# Patient Record
Sex: MALE | Race: WHITE | Employment: FULL TIME | URBAN - METROPOLITAN AREA
[De-identification: names, ages, dates, MRNs, and addresses within clinical notes are randomized per-mention and may not be internally consistent; named-entity substitution may affect disease eponyms.]

---

## 2017-02-28 ENCOUNTER — HOSPITAL ENCOUNTER (OUTPATIENT)
Dept: LAB | Age: 57
Discharge: HOME | End: 2017-02-28

## 2017-02-28 PROCEDURE — 88305 TISSUE EXAM BY PATHOLOGIST: CPT | Performed by: INTERNAL MEDICINE

## 2021-05-14 PROBLEM — I10 HYPERTENSION: Status: ACTIVE | Noted: 2021-05-14

## 2021-05-14 PROBLEM — Z12.11 COLON CANCER SCREENING: Status: ACTIVE | Noted: 2021-05-14

## 2021-05-14 PROBLEM — Z12.5 PROSTATE CANCER SCREENING: Status: ACTIVE | Noted: 2021-05-14

## 2021-11-15 ENCOUNTER — HOSPITAL ENCOUNTER (OUTPATIENT)
Dept: GENERAL RADIOLOGY | Age: 61
Discharge: HOME | End: 2021-11-15

## 2021-11-15 DIAGNOSIS — G89.29 CHRONIC LEFT SHOULDER PAIN: ICD-10-CM

## 2021-11-15 DIAGNOSIS — M25.512 CHRONIC LEFT SHOULDER PAIN: ICD-10-CM

## 2021-11-15 NOTE — PROGRESS NOTES
Call patient. Left shoulder x-rays are normal.  Refer to [place] [address] for left shoulder pain.   Notify patient regarding referral.

## 2021-11-15 NOTE — PROGRESS NOTES
Left detailed message on personal VM regarding results. Advised referral being placed to [facility] and let patient know they will call to schedule.  Referral placed to [facility]

## 2022-03-18 PROBLEM — Z12.11 COLON CANCER SCREENING: Status: ACTIVE | Noted: 2021-05-14

## 2022-03-19 PROBLEM — I10 HYPERTENSION: Status: ACTIVE | Noted: 2021-05-14

## 2022-03-19 PROBLEM — Z12.5 PROSTATE CANCER SCREENING: Status: ACTIVE | Noted: 2021-05-14

## 2022-04-25 DIAGNOSIS — R73.01 IMPAIRED FASTING GLUCOSE: ICD-10-CM

## 2022-04-25 DIAGNOSIS — E78.5 DYSLIPIDEMIA: ICD-10-CM

## 2022-04-25 DIAGNOSIS — I10 HYPERTENSION, UNSPECIFIED TYPE: Primary | ICD-10-CM

## 2022-05-16 DIAGNOSIS — C61 PROSTATE CANCER (HCC): Primary | ICD-10-CM

## 2022-05-16 DIAGNOSIS — Z12.5 PROSTATE CANCER SCREENING: ICD-10-CM

## 2022-06-15 DIAGNOSIS — M77.8 LEFT SHOULDER TENDINITIS: Primary | ICD-10-CM

## 2022-06-15 RX ORDER — CELECOXIB 200 MG/1
200 CAPSULE ORAL 2 TIMES DAILY
Qty: 28 CAPSULE | Refills: 0 | Status: SHIPPED | OUTPATIENT
Start: 2022-06-15 | End: 2022-06-23

## 2022-06-16 ENCOUNTER — TELEPHONE (OUTPATIENT)
Dept: INTERNAL MEDICINE CLINIC | Facility: CLINIC | Age: 62
End: 2022-06-16

## 2022-06-16 RX ORDER — METHYLPREDNISOLONE 4 MG/1
TABLET ORAL
Qty: 1 KIT | Refills: 0 | Status: SHIPPED | OUTPATIENT
Start: 2022-06-16 | End: 2022-06-22

## 2022-06-20 NOTE — TELEPHONE ENCOUNTER
Spoke with patient regarding recurrent left shoulder tendinitis. Pain interfering with sleep at night and worse with attempted overhead motion. No weakness. Previous left shoulder X-rays negative. Has not improved with Celebrex 200 mg po BID. Referral placed to [facility] orthopedics appointment pending. Trial Medrol dose pack x one.

## 2022-06-23 ENCOUNTER — OFFICE VISIT (OUTPATIENT)
Dept: INTERNAL MEDICINE CLINIC | Facility: CLINIC | Age: 62
End: 2022-06-23
Payer: COMMERCIAL

## 2022-06-23 VITALS
OXYGEN SATURATION: 98 % | SYSTOLIC BLOOD PRESSURE: 168 MMHG | HEIGHT: 69 IN | BODY MASS INDEX: 31.76 KG/M2 | DIASTOLIC BLOOD PRESSURE: 94 MMHG | HEART RATE: 74 BPM | WEIGHT: 214.4 LBS | RESPIRATION RATE: 16 BRPM

## 2022-06-23 DIAGNOSIS — M25.512 ACUTE PAIN OF LEFT SHOULDER: Primary | ICD-10-CM

## 2022-06-23 DIAGNOSIS — F41.0 PANIC DISORDER: ICD-10-CM

## 2022-06-23 DIAGNOSIS — M77.8 LEFT SHOULDER TENDINITIS: ICD-10-CM

## 2022-06-23 PROCEDURE — 99214 OFFICE O/P EST MOD 30 MIN: CPT | Performed by: INTERNAL MEDICINE

## 2022-06-23 RX ORDER — PAROXETINE 10 MG/1
10 TABLET, FILM COATED ORAL DAILY
Qty: 90 TABLET | Refills: 0 | Status: SHIPPED | OUTPATIENT
Start: 2022-06-23

## 2022-06-23 RX ORDER — TIZANIDINE 2 MG/1
2 TABLET ORAL EVERY 6 HOURS PRN
Qty: 20 TABLET | Refills: 0 | Status: SHIPPED | OUTPATIENT
Start: 2022-06-23 | End: 2022-07-05

## 2022-06-23 RX ORDER — CELECOXIB 200 MG/1
200 CAPSULE ORAL 2 TIMES DAILY
Qty: 28 CAPSULE | Refills: 0 | Status: SHIPPED | OUTPATIENT
Start: 2022-06-23

## 2022-06-23 ASSESSMENT — ANXIETY QUESTIONNAIRES
7. FEELING AFRAID AS IF SOMETHING AWFUL MIGHT HAPPEN: 0
3. WORRYING TOO MUCH ABOUT DIFFERENT THINGS: 0
6. BECOMING EASILY ANNOYED OR IRRITABLE: 0
2. NOT BEING ABLE TO STOP OR CONTROL WORRYING: 0
GAD7 TOTAL SCORE: 0
4. TROUBLE RELAXING: 0
5. BEING SO RESTLESS THAT IT IS HARD TO SIT STILL: 0
1. FEELING NERVOUS, ANXIOUS, OR ON EDGE: 0

## 2022-06-23 ASSESSMENT — PATIENT HEALTH QUESTIONNAIRE - PHQ9
SUM OF ALL RESPONSES TO PHQ QUESTIONS 1-9: 0
SUM OF ALL RESPONSES TO PHQ QUESTIONS 1-9: 0
2. FEELING DOWN, DEPRESSED OR HOPELESS: 0
SUM OF ALL RESPONSES TO PHQ QUESTIONS 1-9: 0
SUM OF ALL RESPONSES TO PHQ QUESTIONS 1-9: 0
SUM OF ALL RESPONSES TO PHQ9 QUESTIONS 1 & 2: 0
1. LITTLE INTEREST OR PLEASURE IN DOING THINGS: 0

## 2022-06-23 ASSESSMENT — ENCOUNTER SYMPTOMS
SHORTNESS OF BREATH: 0
PHOTOPHOBIA: 0
WHEEZING: 0
CHEST TIGHTNESS: 0

## 2022-06-23 NOTE — PROGRESS NOTES
Chief Complaint   Patient presents with    Shoulder Pain     radiates down arm. Left. Celebrex, Medrol didn't help. Ana Rosa Jung is a 63 y.o. male who presents today for Shoulder Pain (radiates down arm. Left. Celebrex, Medrol didn't help. 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 He has been communicating with his primary care Dr. Marcia Melgar, he tried Celebrex and corticosteroid package, without significant improvement, his pain change from 2-8 over 10 depending of the activity he is doing, the worse exacerbating factor is laying down, is better when he is sitting. Denies any joint swelling. Similar episode months ago, that resolved with Celebrex, he has arrange a follow-up with orthopedic for next month. Had a normal x-ray done 10 last year    Requesting refill of his medications Paxil. Plan to follow-up with PCP next week      Wt Readings from Last 3 Encounters:   06/23/22 214 lb 6.4 oz (97.3 kg)   12/29/21 211 lb 6.4 oz (95.9 kg)   10/13/21 216 lb (98 kg)     Vitals:    06/23/22 1454   BP: (!) 168/94   Site: Left Upper Arm   Position: Sitting   Pulse: 74   Resp: 16   SpO2: 98%   Weight: 214 lb 6.4 oz (97.3 kg)   Height: 5' 9\" (1.753 m)        Assessment and plan:  1. Acute pain of left shoulder  -     tiZANidine (ZANAFLEX) 2 MG tablet; Take 1 tablet by mouth every 6 hours as needed (for pain), Disp-20 tablet, R-0Normal  2. Panic disorder  -     PARoxetine (PAXIL) 10 MG tablet; Take 1 tablet by mouth daily, Disp-90 tablet, R-0Normal  3. Left shoulder tendinitis  -     celecoxib (CELEBREX) 200 MG capsule;  Take 1 capsule by mouth 2 times daily, Disp-28 capsule, R-0Normal  Left shoulder pain, that is not responding to conservative therapy, will try again Celebrex, with muscle relaxant tizanidine as needed. We will follow-up with PCP next week, may require further imaging if not improvement, including scapular x-ray, or MRI of the shoulder. He will follow-up with orthopedics as planned. Discussed about the importance of exercises. Return if symptoms worsen or fail to improve. Review of system:    Review of Systems   Constitutional: Negative for activity change, fatigue and unexpected weight change. Eyes: Negative for photophobia and visual disturbance. Respiratory: Negative for chest tightness, shortness of breath and wheezing. Cardiovascular: Negative for chest pain, palpitations and leg swelling. Musculoskeletal: Positive for arthralgias and myalgias. Negative for joint swelling. Neurological: Negative for dizziness and headaches. Immunization history:    Immunization History   Administered Date(s) Administered    COVID-19, Moderna, Primary or Immunocompromised, PF, 100mcg/0.5mL 08/14/2021, 09/11/2021    Tdap (Boostrix, Adacel) 08/20/2019       Current medications:      Current Outpatient Medications:     PARoxetine (PAXIL) 10 MG tablet, Take 1 tablet by mouth daily, Disp: 90 tablet, Rfl: 0    celecoxib (CELEBREX) 200 MG capsule, Take 1 capsule by mouth 2 times daily, Disp: 28 capsule, Rfl: 0    tiZANidine (ZANAFLEX) 2 MG tablet, Take 1 tablet by mouth every 6 hours as needed (for pain), Disp: 20 tablet, Rfl: 0    clonazePAM (KLONOPIN) 0.5 MG tablet, Take 0.25 mg by mouth daily as needed. , Disp: , Rfl:     lisinopril (PRINIVIL;ZESTRIL) 10 MG tablet, TAKE 1 TABLET BY MOUTH ONCE DAILY, Disp: , Rfl:     tadalafil (CIALIS) 20 MG tablet, Take 20 mg by mouth as needed, Disp: , Rfl:     traZODone (DESYREL) 50 MG tablet, Take 50 mg by mouth, Disp: , Rfl:       Family history:    Family History   Problem Relation Age of Onset    Breast Cancer Mother     Diabetes Father     Hypertension Father    [facility] Cancer Maternal Grandfather         stomach        Past medical history:    Past Medical History:   Diagnosis Date    Dyslipidemia     ED (erectile dysfunction)     HSV-2 infection     Hypertension     Impaired fasting glucose     Panic disorder           Physical exam:    BP (!) 168/94 (Site: Left Upper Arm, Position: Sitting)   Pulse 74   Resp 16   Ht 5' 9\" (1.753 m)   Wt 214 lb 6.4 oz (97.3 kg)   SpO2 98%   BMI 31.66 kg/m²     Physical Exam  Vitals reviewed. Constitutional:       Appearance: Normal appearance. Cardiovascular:      Rate and Rhythm: Normal rate and regular rhythm. Pulses: Normal pulses. Pulmonary:      Effort: Pulmonary effort is normal.      Breath sounds: Normal breath sounds. Musculoskeletal:      Right shoulder: Normal.      Left shoulder: Tenderness present. No swelling, deformity, effusion or laceration. Normal range of motion. Arms:    Neurological:      Mental Status: He is alert.           Recent labs:    Lab Results   Component Value Date    CHOL 178 12/15/2021    CHOL 184 06/15/2021     Lab Results   Component Value Date    TRIG 127 12/15/2021    TRIG 108 06/15/2021     Lab Results   Component Value Date    HDL 34 (L) 12/15/2021    HDL 37 (L) 06/15/2021     Lab Results   Component Value Date    LDLCALC 121 (H) 12/15/2021    LDLCALC 127 (H) 06/15/2021     Lab Results   Component Value Date    VLDL 23 12/15/2021    VLDL 20 06/15/2021     No results found for: [facility]  Lab Results   Component Value Date     12/15/2021    K 4.5 12/15/2021    CL 99 12/15/2021    CO2 24 12/15/2021    BUN 17 12/15/2021    CREATININE 1.09 12/15/2021    GLUCOSE 89 12/15/2021    CALCIUM 9.3 12/15/2021    PROT 6.8 12/15/2021    LABALBU 4.7 12/15/2021    BILITOT 0.5 12/15/2021    ALKPHOS 66 12/15/2021    AST 20 12/15/2021    ALT 32 12/15/2021    GFRAA 85 12/15/2021    AGRATIO 2.2 12/15/2021     Lab Results   Component Value Date    WBC 5.9 06/15/2021    HGB 16.3 06/15/2021 HCT 48.5 06/15/2021    MCV 92 06/15/2021     06/15/2021             This document was generated with the aid of voice recognition software. . Please be aware that there may be inadvertent transcription errors not identified and corrected by the [employer]

## 2022-06-23 NOTE — PATIENT INSTRUCTIONS
Patient Education        Shoulder Blade: Exercises  Introduction  Here are some examples of exercises for you to try. The exercises may be suggested for a condition or for rehabilitation. Start each exercise slowly. Ease off the exercises if you start to have pain. You will be told when to start these exercises and which ones will work bestfor you. How to do the exercises  Shoulder roll    1. Stand tall with your chin slightly tucked. Imagine that a string at the top of your head is pulling you straight up. 2. Keep your arms relaxed. All motion will be in your shoulders. 3. Shrug your shoulders up toward your ears, then up and back. [place] your shoulders down and back, like you're sliding your hands down into your back pants pockets. 4. Repeat the circles at least 2 to 4 times. 5. This exercise is also helpful anytime you want to relax. Lower neck and upper back stretch    1. With your arms about shoulder height, clasp your hands in front of you. 2. Drop your chin toward your chest.  3. Reach straight forward so you are rounding your upper back. Think about pulling your shoulder blades apart. Paulita Dakin feel a stretch across your upper back and shoulders. Hold for at least 6 seconds. 4. Repeat 2 to 4 times. Triceps stretch    1. Reach your arm straight up. 2. Keeping your elbow in place, bend your arm and reach your hand down behind your back. 3. With your other hand, apply gentle pressure to the bent elbow. Paulita Dakin feel a stretch at the back of your upper arm and shoulder. Hold about 6 seconds. 4. Repeat 2 to 4 times with each arm. Shoulder stretch    1. Relax your shoulders. 2. Raise one arm to shoulder height, and reach it across your chest.  3. Pull the arm slightly toward you with your other arm. This will help you get a gentle stretch. Hold for about 6 seconds. 4. Repeat 2 to 4 times. Shoulder blade squeeze    1. Sit or stand up tall with your arms at your sides.   2. Keep your shoulders relaxed and down, not shrugged. 3. Squeeze your shoulder blades together. Hold for 6 seconds, then relax. 4. Repeat 8 to 12 times. Straight-arm shoulder blade squeeze    1. Sit or stand tall. Relax your shoulders. 2. With palms down, hold your elastic tubing or band straight out in front of you. 3. Start with slight tension in the tubing or band, with your hands about shoulder-width apart. 4. Slowly pull straight out to the sides, squeezing your shoulder blades together. Keep your arms straight and at shoulder height. Slowly release. 5. Repeat 8 to 12 times. Rowing    1. [place] your elastic tubing or band at about waist height. Take one end in each hand. 2. Sit or stand with your feet hip-width apart. 3. Hold your arms straight in front of you. Adjust your distance to create slight tension in the tubing or band. 4. Slightly tuck your chin. Relax your shoulders. 5. Without shrugging your shoulders, pull straight back. Your elbows will pass alongside your waist.  Pull-downs    1. [place] your elastic tubing or band in the top of a closed door. Take one end in each hand. 2. Either sit or stand, depending on what is more comfortable. If you feel unsteady, sit on a chair. 3. Start with your arms up and comfortably apart, elbows straight. There should be a slight tension in the tubing or band. 4. Slightly tuck your chin, and look straight ahead. 5. Keeping your back straight, slowly pull down and back, bending your elbows. 6. Stop where your hands are level with your chin, in a \"goalpost\" position. 7. Repeat 8 to 12 times. Chest T stretch    1. Lie on your back. Raise your knees so they are bent. Plant your feet on the floor, hip-width apart. 2. Tuck your chin, and relax your shoulders. 3. Reach your arms straight out to the sides. If you don't feel a mild stretch in your shoulders and across your chest, use a foam roll or a tightly rolled blanket under your spine, from your tailbone to your head.   4. Relax in this position for at least 15 to 30 seconds while you breathe normally. Repeat 2 to 4 times. 5. As you get used to this stretch, keep adding a little more time until you are able relax in this position for 2 or 3 minutes. When you can relax for at least 2 minutes, you only need to do the exercise 1 time per session. Chest goalpost stretch    1. Lie on your back. Raise your knees so they are bent. Plant your feet on the floor, hip-width apart. 2. Tuck your chin, and relax your shoulders. 3. Reach your arms straight out to the sides. 4. Bend your arms at the elbows, with your hands pointed toward the top of your head. Your arms should make an L on either side of your head. Your palms should be facing up. 5. If you don't feel a mild stretch in your shoulders and across your chest, use a foam roll or tightly rolled blanket under your spine, from your tailbone to your head. 6. Relax in this position for at least 15 to 30 seconds while you breathe normally. Repeat 2 to 4 times. 7. Each day you do this exercise, add a little more time until you can relax in this position for 2 or 3 minutes. When you can relax for at least 2 minutes, you only need to do the exercise 1 time per session. Follow-up care is a key part of your treatment and safety. Be sure to make and go to all appointments, and call your doctor if you are having problems. It's also a good idea to know your test results and keep alist of the medicines you take. Where can you learn more? Go to https://[employer]. org and sign in to your [employer] account. Enter ([phone] in the [facility] box to learn more about \"Shoulder Blade: Exercises. \"     If you do not have an account, please click on the \"Sign Up Now\" link. Current as of: March 9, 2022               Content Version: 13.3  © 7047-6279 [employer]. Care instructions adapted under license by [facility] ([facility]).  If you have questions about a medical condition or this instruction, always ask your healthcare professional. [place] [address] any warranty or liability for your use of this information.

## 2022-06-27 ENCOUNTER — CLINICAL DOCUMENTATION (OUTPATIENT)
Dept: INTERNAL MEDICINE CLINIC | Facility: CLINIC | Age: 62
End: 2022-06-27

## 2022-06-27 DIAGNOSIS — M25.512 ACUTE PAIN OF LEFT SHOULDER: ICD-10-CM

## 2022-06-27 DIAGNOSIS — M25.512 LEFT SHOULDER PAIN, UNSPECIFIED CHRONICITY: Primary | ICD-10-CM

## 2022-06-27 DIAGNOSIS — M54.2 NECK PAIN ON LEFT SIDE: ICD-10-CM

## 2022-06-27 RX ORDER — GABAPENTIN 300 MG/1
CAPSULE ORAL
Qty: 90 CAPSULE | Refills: 0 | Status: SHIPPED | OUTPATIENT
Start: 2022-06-27 | End: 2022-07-27

## 2022-06-27 ASSESSMENT — ENCOUNTER SYMPTOMS
STRIDOR: 0
EYE PAIN: 0
RECTAL PAIN: 0
VOICE CHANGE: 0
CHOKING: 0

## 2022-06-27 NOTE — PROGRESS NOTES
FOLLOWUP VISIT    Subjective:    Mr. Martin Pérez is a 63 y.o., male,   Chief Complaint   Patient presents with    Shoulder Pain       HPI:    Patient was seen today by me at his home. The patient happens to live next door to me and when I arrived home today from work he asked me to evaluate his persistent left shoulder pain. Patient has been having severe pain involving his left shoulder at times radiating down his left arm into the forearm. We spoke via telephone approximately a week ago at which time he felt his current left shoulder pain was similar to the left rotator cuff tendinitis pain he had experienced months ago. He was treated empirically with Celebrex 200 mg po BID which did not help. He was next given a Medrol dose pack which did not help. A referral was placed to orthopedics but their next available appointment is not until mid-late July. He was seen several days ago by Dr. Katlyn Lai and placed on tizanidine in addition to Celebrex but he remains in pain. He states that pain is located vaguely all around his left anterior / superior / posterior shoulder area. Occasionally he has interscapular pain on the left. Sometimes it hurts to raise his left arm above his head (he has had a history of left rotator cuff tendinitis) but his pain primarily occurs at night when he attempts to lie supine in bed which his head on his pillow. He has had to sleep upright in a chair with his head / neck and shoulder propped up a certain way. Today he also provides some additional history. .. his current pain began about two weeks ago when he was driving a tractor and hit a hidden stump which caused the tractor to come to an abrupt stop and he sustained a whiplash type injury. He denies any upper extremity weakness or numbness.        The following portions of the patient's history were reviewed and updated as appropriate:      Past Medical History:   Diagnosis Date    Dyslipidemia     ED (erectile dysfunction)     HSV-2 infection     Hypertension     Impaired fasting glucose     Panic disorder        Past Surgical History:   Procedure Laterality Date    TONSILLECTOMY         Family History   Problem Relation Age of Onset    Breast Cancer Mother     Diabetes Father     Hypertension Father     Cancer Maternal Grandfather         stomach       Social History     Socioeconomic History    Marital status:      Spouse name: Not on file    Number of children: Not on file    Years of education: Not on file    Highest education level: Not on file   Occupational History    Not on file   Tobacco Use    Smoking status: Never Smoker    Smokeless tobacco: Never Used   Substance and Sexual Activity    Alcohol use: No    Drug use: No    Sexual activity: Not on file   Other Topics Concern    Not on file   Social History Narrative    Not on file     Social Determinants of Health     Financial Resource Strain:     Difficulty of Paying Living Expenses: Not on file   Food Insecurity:     Worried About Running Out of Food in the Last Year: Not on file    Chandan of Food in the Last Year: Not on file   Transportation Needs:     Lack of Transportation (Medical): Not on file    Lack of Transportation (Non-Medical):  Not on file   Physical Activity:     Days of Exercise per Week: Not on file    Minutes of Exercise per Session: Not on file   Stress:     Feeling of Stress : Not on file   Social Connections:     Frequency of Communication with Friends and Family: Not on file    Frequency of Social Gatherings with Friends and Family: Not on file    Attends Jehov[employer]'s witness Services: Not on file    Active Member of Clubs or Organizations: Not on file    Attends Club or Organization Meetings: Not on file    Marital Status: Not on file   Intimate Partner Violence:     Fear of Current or Ex-Partner: Not on file    Emotionally Abused: Not on file    Physically Abused: Not on file    Sexually Abused: Not on file   Housing Stability:     Unable to Pay for Housing in the Last Year: Not on file    Number of Places Lived in the Last Year: Not on file    Unstable Housing in the Last Year: Not on file       Current Outpatient Medications   Medication Sig Dispense Refill    PARoxetine (PAXIL) 10 MG tablet Take 1 tablet by mouth daily 90 tablet 0    celecoxib (CELEBREX) 200 MG capsule Take 1 capsule by mouth 2 times daily 28 capsule 0    tiZANidine (ZANAFLEX) 2 MG tablet Take 1 tablet by mouth every 6 hours as needed (for pain) 20 tablet 0    clonazePAM (KLONOPIN) 0.5 MG tablet Take 0.25 mg by mouth daily as needed.  lisinopril (PRINIVIL;ZESTRIL) 10 MG tablet TAKE 1 TABLET BY MOUTH ONCE DAILY      tadalafil (CIALIS) 20 MG tablet Take 20 mg by mouth as needed      traZODone (DESYREL) 50 MG tablet Take 50 mg by mouth       No current facility-administered medications for this visit. Allergies as of 06/27/2022    (No Known Allergies)       Review of Systems   Constitutional: Negative for activity change and appetite change. HENT: Negative for drooling and voice change. Eyes: Negative for pain. Respiratory: Negative for choking and stridor. Gastrointestinal: Negative for rectal pain. Endocrine: Negative for polydipsia and polyphagia. Genitourinary: Negative for enuresis and penile pain. Musculoskeletal: Negative for gait problem and neck stiffness. Skin: Negative for pallor. Allergic/Immunologic: Negative for immunocompromised state. Neurological: Negative for facial asymmetry and speech difficulty. Hematological: Does not bruise/bleed easily. Psychiatric/Behavioral: Negative for self-injury. The patient is not hyperactive. Patient Care Team:  Laura Iqbal MD as PCP - General  Laura Iqbal MD as PCP - REHABILITATION [facility] Empaneled Provider    Objective: There were no vitals taken for this visit. Physical Exam  Constitutional:       General: He is not in acute distress. Appearance: He is not toxic-appearing. HENT:      Head: Normocephalic and atraumatic. Nose: Nose normal.   Eyes:      Extraocular Movements: Extraocular movements intact. Conjunctiva/sclera: Conjunctivae normal.   Pulmonary:      Effort: Pulmonary effort is normal. No respiratory distress. Breath sounds: No stridor. Musculoskeletal:      Comments: Patient has no bony vertebral tenderness. He has mild pain with palpation over the left cervical paraspinal muscles. He can rotate his chin toward his right shoulder but when he attempts to rotate his chin toward the left shoulder he has reproduction of his left shoulder pain radiating into his left forearm. His pain is even worse if he attempts to rotate his chin to the left shoulder while simultaneously extending his neck. He has mild pain with palpation of the left subacromial space and a small amount of discomfort with left shoulder ROM but he is able to completed adduct and abduct his left shoulder 180 degrees without weakness. Motor is 5/5 in all motor groups of both upper extremities.  strength intact and symmetric both hands. Sensory intact throughout both upper extremities. DTR's were somewhat difficult to elicit on either side as I think he was involuntarily guarding during attempted DTR examination. Skin:     Coloration: Skin is not jaundiced or pale. Neurological:      Mental Status: He is alert and oriented to person, place, and time. Psychiatric:         Thought Content:  Thought content normal.              [facility] Historical Encounter on 12/15/2021   Component Date Value Ref Range Status    Glucose 12/15/2021 89  65 - 99 mg/dL Final    BUN 12/15/2021 17  6 - 24 mg/dL Final    CREATININE 12/15/2021 1.09  0.76 - 1.27 mg/dL Final    EGFR IF NonAfrican American 12/15/2021 74  >59 mL/min/1.73 Final    GFR  12/15/2021 85  >59 mL/min/1.73 Final    Comment: **In accordance with recommendations from the NKF-ASN Task force,**    [facility] is in the process of updating its eGFR calculation to the    2021 CKD-EPI creatinine equation that estimates kidney function    without a race variable.  Bun/Cre Ratio 12/15/2021 16  9 - 20 NA Final    Sodium 12/15/2021 138  134 - 144 mmol/L Final    Potassium 12/15/2021 4.5  3.5 - 5.2 mmol/L Final    Chloride 12/15/2021 99  96 - 106 mmol/L Final    CO2 12/15/2021 24  20 - 29 mmol/L Final    Calcium 12/15/2021 9.3  8.7 - 10.2 mg/dL Final    Total Protein 12/15/2021 6.8  6.0 - 8.5 g/dL Final    Albumin 12/15/2021 4.7  3.8 - 4.9 g/dL Final    Globulin, Total 12/15/2021 2.1  1.5 - 4.5 g/dL Final    Albumin/Globulin Ratio 12/15/2021 2.2  1.2 - 2.2 NA Final    Total Bilirubin 12/15/2021 0.5  0.0 - 1.2 mg/dL Final    Alkaline Phosphatase 12/15/2021 66  44 - 121 IU/L Final                  **Please note reference interval change**    AST 12/15/2021 20  0 - 40 IU/L Final    ALT 12/15/2021 32  0 - 44 IU/L Final    Hemoglobin A1C 12/15/2021 5.9* 4.8 - 5.6 % Final    Comment:          Prediabetes: 5.7 - 6.4           Diabetes: >6.4           Glycemic control for adults with diabetes: <7.0      eAG 12/15/2021 123  mg/dL Final    Cholesterol, Total 12/15/2021 178  100 - 199 mg/dL Final    Triglycerides 12/15/2021 127  0 - 149 mg/dL Final    HDL 12/15/2021 34* >39 mg/dL Final    VLDL 12/15/2021 23  5 - 40 mg/dL Final    LDL Calculated 12/15/2021 121* 0 - 99 mg/dL Final         Assessent & Plan:        1. Left shoulder pain, unspecified chronicity  -     gabapentin (NEURONTIN) 300 MG capsule; Take 1-3 capsules at bedtime as directed, Disp-90 capsule, R-0Normal  -     [facility] - Physical Therapy, [facility]  2.  Acute pain of left shoulder  Overview:  I suspect the patient has both a mild case of smoldering chronic left rotator cuff tendinitis and a superimposed acute left cervical radiculopathy triggered by a whiplash injury sustained while driving a tractor in a field and hitting a hidden stump. Based on today's examination on 6/27/22 I suspect the main primary issue related to his current shoulder pain is an acute cervical radiculopathy. He has no motor or sensory deficit. He has had no improvement despite a medrol dose pack and current celebrex 200 mg po BID + tizanidine 2 mg QD PRN. Since his symptoms are most bothersome at night will start gabapentin 300 mg po QHS. He may increase this dose to 600 mg and then to 900 mg every several days if needed. He was warned regarding the potential sedating effect of gabapentin and advised to not combine with tizanidine, trazodone, or alcohol / other sedatives. Will obtain plain films of the cervical spine given his whiplash injury to rule out spondylolisthesis but I expect all they may reveal is age related degenerative changes. Will refer to PT. If symptoms persist despite all of the above at that point I think he will require an MRI. Orders:  [facility] - Physical Therapy, [facility]  3. Neck pain on left side  -     XR CERVICAL SPINE (2-3 VIEWS); Future  -     [facility] - Physical Therapy, [facility]        The patient and/or patient representative voiced understanding and agreement with the current diagnoses, recommendations, and possible side effects. No follow-ups on file.   F/U as scheduled with me on 7/5/22

## 2022-07-01 DIAGNOSIS — Z12.5 PROSTATE CANCER SCREENING: ICD-10-CM

## 2022-07-01 DIAGNOSIS — E78.5 DYSLIPIDEMIA: ICD-10-CM

## 2022-07-01 DIAGNOSIS — R73.01 IMPAIRED FASTING GLUCOSE: ICD-10-CM

## 2022-07-01 DIAGNOSIS — I10 HYPERTENSION, UNSPECIFIED TYPE: ICD-10-CM

## 2022-07-01 LAB
ALBUMIN SERPL-MCNC: 4.2 G/DL (ref 3.5–5)
ALBUMIN/GLOB SERPL: 1.4 {RATIO} (ref 1.2–3.5)
ALP SERPL-CCNC: 70 U/L (ref 50–136)
ALT SERPL-CCNC: 51 U/L (ref 12–65)
ANION GAP SERPL CALC-SCNC: 8 MMOL/L (ref 7–16)
AST SERPL-CCNC: 22 U/L (ref 15–37)
BILIRUB SERPL-MCNC: 0.4 MG/DL (ref 0.2–1.1)
BUN SERPL-MCNC: 18 MG/DL (ref 6–23)
CALCIUM SERPL-MCNC: 9.2 MG/DL (ref 8.3–10.4)
CHLORIDE SERPL-SCNC: 105 MMOL/L (ref 98–107)
CHOLEST SERPL-MCNC: 165 MG/DL
CO2 SERPL-SCNC: 25 MMOL/L (ref 21–32)
CREAT SERPL-MCNC: 1.1 MG/DL (ref 0.8–1.5)
EST. AVERAGE GLUCOSE BLD GHB EST-MCNC: 126 MG/DL
GLOBULIN SER CALC-MCNC: 3 G/DL (ref 2.3–3.5)
GLUCOSE SERPL-MCNC: 123 MG/DL (ref 65–100)
HBA1C MFR BLD: 6 % (ref 4.2–6.3)
HDLC SERPL-MCNC: 37 MG/DL (ref 40–60)
HDLC SERPL: 4.5 {RATIO}
LDLC SERPL CALC-MCNC: 107.2 MG/DL
POTASSIUM SERPL-SCNC: 4.6 MMOL/L (ref 3.5–5.1)
PROT SERPL-MCNC: 7.2 G/DL (ref 6.3–8.2)
PSA SERPL-MCNC: 0.5 NG/ML
SODIUM SERPL-SCNC: 138 MMOL/L (ref 138–145)
TRIGL SERPL-MCNC: 104 MG/DL (ref 35–150)
VLDLC SERPL CALC-MCNC: 20.8 MG/DL (ref 6–23)

## 2022-07-05 ENCOUNTER — HOSPITAL ENCOUNTER (OUTPATIENT)
Dept: GENERAL RADIOLOGY | Age: 62
Discharge: HOME | End: 2022-07-07
Payer: COMMERCIAL

## 2022-07-05 ENCOUNTER — OFFICE VISIT (OUTPATIENT)
Dept: INTERNAL MEDICINE CLINIC | Facility: CLINIC | Age: 62
End: 2022-07-05
Payer: COMMERCIAL

## 2022-07-05 VITALS
DIASTOLIC BLOOD PRESSURE: 82 MMHG | TEMPERATURE: 98.5 F | HEART RATE: 98 BPM | OXYGEN SATURATION: 98 % | WEIGHT: 213.8 LBS | HEIGHT: 69 IN | SYSTOLIC BLOOD PRESSURE: 136 MMHG | BODY MASS INDEX: 31.67 KG/M2

## 2022-07-05 DIAGNOSIS — F41.0 PANIC DISORDER: ICD-10-CM

## 2022-07-05 DIAGNOSIS — Z12.11 COLON CANCER SCREENING: ICD-10-CM

## 2022-07-05 DIAGNOSIS — Z12.5 PROSTATE CANCER SCREENING: ICD-10-CM

## 2022-07-05 DIAGNOSIS — R73.01 IMPAIRED FASTING GLUCOSE: ICD-10-CM

## 2022-07-05 DIAGNOSIS — R39.12 BENIGN PROSTATIC HYPERPLASIA WITH WEAK URINARY STREAM: ICD-10-CM

## 2022-07-05 DIAGNOSIS — M25.512 ACUTE PAIN OF LEFT SHOULDER: ICD-10-CM

## 2022-07-05 DIAGNOSIS — M54.2 NECK PAIN ON LEFT SIDE: ICD-10-CM

## 2022-07-05 DIAGNOSIS — N40.1 BENIGN PROSTATIC HYPERPLASIA WITH WEAK URINARY STREAM: ICD-10-CM

## 2022-07-05 DIAGNOSIS — E78.5 DYSLIPIDEMIA: ICD-10-CM

## 2022-07-05 DIAGNOSIS — I10 PRIMARY HYPERTENSION: ICD-10-CM

## 2022-07-05 DIAGNOSIS — M54.12 CERVICAL RADICULOPATHY: ICD-10-CM

## 2022-07-05 PROBLEM — N40.0 BPH (BENIGN PROSTATIC HYPERPLASIA): Status: ACTIVE | Noted: 2022-07-05

## 2022-07-05 PROCEDURE — 72040 X-RAY EXAM NECK SPINE 2-3 VW: CPT

## 2022-07-05 PROCEDURE — 99214 OFFICE O/P EST MOD 30 MIN: CPT | Performed by: INTERNAL MEDICINE

## 2022-07-05 RX ORDER — TAMSULOSIN HYDROCHLORIDE 0.4 MG/1
0.4 CAPSULE ORAL DAILY
Qty: 90 CAPSULE | Refills: 1 | Status: SHIPPED | OUTPATIENT
Start: 2022-07-05

## 2022-07-05 RX ORDER — LISINOPRIL 10 MG/1
10 TABLET ORAL DAILY
Qty: 90 TABLET | Refills: 3 | Status: SHIPPED | OUTPATIENT
Start: 2022-07-05 | End: 2022-07-22 | Stop reason: SDUPTHER

## 2022-07-05 ASSESSMENT — ENCOUNTER SYMPTOMS
STRIDOR: 0
RECTAL PAIN: 0
VOICE CHANGE: 0
EYE PAIN: 0
CHOKING: 0

## 2022-07-05 NOTE — PROGRESS NOTES
FOLLOWUP VISIT    Subjective:    Mr. Wagner Rahman is a 63 y.o., male,   Chief Complaint   Patient presents with    Follow-up Chronic Condition       HPI:    Patient presents today for follow up of two or more chronic medical problems and review of labs. His continues to have pain radiating from neck to left shoulder especially if he turns his head to the left. Gabapentin at bedtime has helped and he can now sleep at night. No weakness or sensory loss. Did not do neck X-rays yet (will do today). Scheduled to start PT tomorrow. Also complains of slow urinary stream and nocturia last several years. Asks about medication for enlarging prostate. The patient has hypertension. The patient has been on an attempted low sodium diet and has been trying to exercise and maintain a healthy weight. The patient reports good compliance with the blood pressure medications and good blood pressure readings on home / ambulatory monitoring. The patient has impaired fasting glucose tolerance. The patient remains on attempted diet exercise and weight loss therapy. The patient denies any symptoms of hyperglycemia.     The following portions of the patient's history were reviewed and updated as appropriate:      Past Medical History:   Diagnosis Date    Dyslipidemia     ED (erectile dysfunction)     HSV-2 infection     Hypertension     Impaired fasting glucose     Panic disorder        Past Surgical History:   Procedure Laterality Date    TONSILLECTOMY         Family History   Problem Relation Age of Onset    Breast Cancer Mother     Diabetes Father     Hypertension Father     Cancer Maternal Grandfather         stomach       Social History     Socioeconomic History    Marital status:      Spouse name: Not on file    Number of children: Not on file    Years of education: Not on file    Highest education level: Not on file   Occupational History    Not on file   Tobacco Use    Smoking status: Never Smoker    Smokeless tobacco: Never Used   Substance and Sexual Activity    Alcohol use: No    Drug use: No    Sexual activity: Not on file   Other Topics Concern    Not on file   Social History Narrative    Not on file     Social Determinants of Health     Financial Resource Strain:     Difficulty of Paying Living Expenses: Not on file   Food Insecurity:     Worried About Running Out of Food in the Last Year: Not on file    Chandan of Food in the Last Year: Not on file   Transportation Needs:     Lack of Transportation (Medical): Not on file    Lack of Transportation (Non-Medical):  Not on file   Physical Activity:     Days of Exercise per Week: Not on file    Minutes of Exercise per Session: Not on file   Stress:     Feeling of Stress : Not on file   Social Connections:     Frequency of Communication with Friends and Family: Not on file    Frequency of Social Gatherings with Friends and Family: Not on file    Attends Zoroastrian Services: Not on file    Active Member of [employer] or Organizations: Not on file    Attends Club or Organization Meetings: Not on file    Marital Status: Not on file   Intimate Partner Violence:     Fear of Current or Ex-Partner: Not on file    Emotionally Abused: Not on file    Physically Abused: Not on file    Sexually Abused: Not on file   Housing Stability:     Unable to Pay for Housing in the Last Year: Not on file    Number of [place] in the Last Year: Not on file    Unstable Housing in the Last Year: Not on file       Current Outpatient Medications   Medication Sig Dispense Refill    lisinopril (PRINIVIL;ZESTRIL) 10 MG tablet Take 1 tablet by mouth daily TAKE 1 TABLET BY MOUTH ONCE DAILY 90 tablet 3           gabapentin (NEURONTIN) 300 MG capsule Take 1-3 capsules at bedtime as directed 90 capsule 0    PARoxetine (PAXIL) 10 MG tablet Take 1 tablet by mouth daily 90 tablet 0    celecoxib (CELEBREX) 200 MG capsule Take 1 capsule by mouth 2 times daily 28 capsule 0    clonazePAM (KLONOPIN) 0.5 MG tablet Take 0.25 mg by mouth daily as needed.  tadalafil (CIALIS) 20 MG tablet Take 20 mg by mouth as needed       No current facility-administered medications for this visit. Allergies as of 07/05/2022    (No Known Allergies)       Review of Systems   Constitutional: Negative for activity change and appetite change. HENT: Negative for drooling and voice change. Eyes: Negative for pain. Respiratory: Negative for choking and stridor. Gastrointestinal: Negative for rectal pain. Endocrine: Negative for polydipsia and polyphagia. Genitourinary: Negative for enuresis and penile pain. Musculoskeletal: Negative for gait problem and neck stiffness. Skin: Negative for pallor. Allergic/Immunologic: Negative for immunocompromised state. Neurological: Negative for facial asymmetry and speech difficulty. Hematological: Does not bruise/bleed easily. Psychiatric/Behavioral: Negative for self-injury. The patient is not hyperactive. Patient Care Team:  Bisi Cantu MD as PCP - General  Bisi Cantu MD as PCP - [facility] Empaneled Provider    Objective:    /82   Pulse 98   Temp 98.5 °F (36.9 °C) (Temporal)   Ht 5' 9\" (1.753 m)   Wt 213 lb 12.8 oz (97 kg)   SpO2 98%   BMI 31.57 kg/m²     Physical Exam  Vitals reviewed. Constitutional:       General: He is not in acute distress. Appearance: Normal appearance. He is not toxic-appearing. HENT:      Head: Normocephalic and atraumatic. Right Ear: Tympanic membrane, ear canal and external ear normal.      Left Ear: Tympanic membrane, ear canal and external ear normal.      Nose: Nose normal.      Mouth/Throat:      Mouth: Mucous membranes are moist.      Pharynx: Oropharynx is clear. Eyes:      General: No scleral icterus. Extraocular Movements: Extraocular movements intact.       Conjunctiva/sclera: Conjunctivae normal.      Pupils: Pupils are equal, round, and reactive to light. Cardiovascular:      Rate and Rhythm: Normal rate and regular rhythm. Pulses: Normal pulses. Heart sounds: Normal heart sounds. Pulmonary:      Effort: Pulmonary effort is normal. No respiratory distress. Breath sounds: Normal breath sounds. No stridor. Abdominal:      General: Abdomen is flat. Bowel sounds are normal.      Palpations: Abdomen is soft. There is no mass. Tenderness: There is no guarding or rebound. Musculoskeletal:         General: Normal range of motion. Cervical back: Normal range of motion and neck supple. Comments: Patient has no bony vertebral tenderness. He has mild pain with palpation over the left cervical paraspinal muscles. He can rotate his chin toward his right shoulder but when he attempts to rotate his chin toward the left shoulder he has reproduction of his left shoulder pain radiating into his left forearm. His pain is even worse if he attempts to rotate his chin to the left shoulder while simultaneously extending his neck. He has mild pain with palpation of the left subacromial space and a small amount of discomfort with left shoulder ROM but he is able to completed adduct and abduct his left shoulder 180 degrees without weakness. Motor is 5/5 in all motor groups of both upper extremities.  strength intact and symmetric both hands. Sensory intact throughout both upper extremities. DTR's were somewhat difficult to elicit on either side as I think he was involuntarily guarding during attempted DTR examination. Skin:     General: Skin is warm and dry. Coloration: Skin is not jaundiced or pale. Neurological:      Mental Status: He is alert and oriented to person, place, and time. Mental status is at baseline. Psychiatric:         Behavior: Behavior normal.         Thought Content:  Thought content normal.              Orders Only on 07/01/2022   Component Date Value Ref Range Status    PSA 07/01/2022 0.5  <4.0 ng/mL Final    Comment: [employer]. New method in use, please reestablish patient baseline      Cholesterol, Total 07/01/2022 165  <200 MG/DL Final    Comment: Borderline High: 200-239 mg/dL  High: Greater than or equal to 240 mg/dL      Triglycerides 07/01/2022 104  35 - 150 MG/DL Final    Comment: Borderline High: 150-199 mg/dL, High: 200-499 mg/dL  Very High: Greater than or equal to 500 mg/dL      HDL 07/01/2022 37* 40 - 60 MG/DL Final    LDL Calculated 07/01/2022 107.2* <100 MG/DL Final    Comment: Near Optimal: 100-129 mg/dL  Borderline High: 130-159, High: 160-189 mg/dL  Very High: Greater than or equal to 190 mg/dL      VLDL Cholesterol Calculated 07/01/2022 20.8  6.0 - 23.0 MG/DL Final    Chol/HDL Ratio 07/01/2022 4.5    Final    Hemoglobin A1C 07/01/2022 6.0  4.20 - 6.30 % Final    eAG 07/01/2022 126  mg/dL Final    Comment: Reference Range  Normal: 4.8-5.6  Diabetic >=6.5%  Normal       <5.7%      Sodium 07/01/2022 138  138 - 145 mmol/L Final    Potassium 07/01/2022 4.6  3.5 - 5.1 mmol/L Final    Chloride 07/01/2022 105  98 - 107 mmol/L Final    CO2 07/01/2022 25  21 - 32 mmol/L Final    Anion Gap 07/01/2022 8  7 - 16 mmol/L Final    Glucose 07/01/2022 123* 65 - 100 mg/dL Final    BUN 07/01/2022 18  6 - 23 MG/DL Final    CREATININE 07/01/2022 1.10  0.8 - 1.5 MG/DL Final    GFR  07/01/2022 >60  >60 ml/min/1.73m2 Final    GFR Non- 07/01/2022 >60  >60 ml/min/1.73m2 Final    Comment:   Estimated GFR is calculated using the Modification of Diet in Renal Disease (MDRD) Study equation, reported for both  Americans (GFRAA) and non- Americans (GFRNA), and normalized to 1.73m2 body surface area. The physician must decide which value applies to the patient. The MDRD study equation should only be used in individuals age 25 or older.  It has not been validated for the following: pregnant women, patients with serious comorbid conditions,or on certain medications, or persons with extremes of body size, muscle mass, or nutritional status.  Calcium 07/01/2022 9.2  8.3 - 10.4 MG/DL Final    Total Bilirubin 07/01/2022 0.4  0.2 - 1.1 MG/DL Final    ALT 07/01/2022 51  12 - 65 U/L Final    AST 07/01/2022 22  15 - 37 U/L Final    Alk Phosphatase 07/01/2022 70  50 - 136 U/L Final    Total Protein 07/01/2022 7.2  6.3 - 8.2 g/dL Final    Albumin 07/01/2022 4.2  3.5 - 5.0 g/dL Final    Globulin 07/01/2022 3.0  2.3 - 3.5 g/dL Final    Albumin/Globulin Ratio 07/01/2022 1.4  1.2 - 3.5   Final         Assessent & Plan:        1. Prostate cancer screening  Overview:  7/1/22 PSA 0.5    2. Panic disorder  Overview:  Symptoms controlled on Paxil 10 mg daily. He may also continue low-dose clonazepam to abort panic attacks. The patient was advised to not take any potentially sedating medications while driving or engaged in any other potentially hazardous situations, and to not combine with other sedatives such as alcohol or other sedating medications. 3. Impaired fasting glucose  Overview:  7/1/22 , A1C 6.0  6/15/21 , A1C 5.8    The patient was educated regarding \"prediabetes\" and the risk for progression over time to diabetes mellitus. We discussed strategies to prevent progression including dietary changes, exercise, and weight loss. Follow labs over time. Father and grandfather both had diabetes. Orders:  -     Comprehensive Metabolic Panel; Future  -     Hemoglobin A1C; Future  4. Primary hypertension  Overview:  Well-controlled on lisinopril 10 mg daily. The patient will continue the current treatment. Orders:  -     lisinopril (PRINIVIL;ZESTRIL) 10 MG tablet; Take 1 tablet by mouth daily TAKE 1 TABLET BY MOUTH ONCE DAILY, Disp-90 tablet, R-3Normal  5. Dyslipidemia  Overview:  7/1/22 total 165 HDL 37   on diet therapy. 6/15/21 total 184, HDL 37, , trig 108.   Ten yr ACC / AHA risk > 7.5%,     Reviewed the most recent lipid panel with the patient, and interpreted the results within the context of the patient's personal cardiovascular risk factors. Discussed/reinforced a low-cholesterol diet. I offered him statin therapy which he declines. States no family history of premature CV disease. Orders:  -     Lipid Panel; Future  6. Colon cancer screening  Overview:  4/3/17 colonoscopy negative (two hyperplastic polyps / internal hemorrhoids). Repeat 10 years. 7. Acute pain of left shoulder  Overview:  11/15/21 left shoulder X-ray unremarkable. 8. Benign prostatic hyperplasia with weak urinary stream  Overview:  Patient reports bothersome BPH symptoms. Trial Flomax. Call if not better. Orders:  -     tamsulosin (FLOMAX) 0.4 MG capsule; Take 1 capsule by mouth daily, Disp-90 capsule, R-1Normal  9. Cervical radiculopathy  Overview:  I suspect the patient has acute left cervical radiculopathy triggered by a whiplash injury sustained while driving a tractor in a field and hitting a hidden stump in early 6/2022. He has no motor or sensory deficit. He has had no improvement despite a medrol dose pack and celebrex 200 mg po BID + tizanidine 2 mg QD PRN. His symptoms are most bothersome when lying in bed at night. Fortunately he responded to bedtime gabapentin. He was warned regarding the potential sedating effect of gabapentin and advised to not combine with tizanidine, trazodone, or alcohol / other sedatives. Will obtain plain films of the cervical spine given his whiplash injury to rule out spondylolisthesis but I expect all they may reveal is age related degenerative changes. Will refer to PT. If symptoms persist despite all of the above at that point I think he will require an MRI. The patient and/or patient representative voiced understanding and agreement with the current diagnoses, recommendations, and possible side effects.     Return for review labs, follow up of chronic medical problems.

## 2022-07-06 ENCOUNTER — HOSPITAL ENCOUNTER (OUTPATIENT)
Dept: PHYSICAL THERAPY | Age: 62
Setting detail: RECURRING SERIES
Discharge: HOME | End: 2022-07-09
Payer: COMMERCIAL

## 2022-07-06 PROCEDURE — 97110 THERAPEUTIC EXERCISES: CPT

## 2022-07-06 PROCEDURE — 97140 MANUAL THERAPY 1/> REGIONS: CPT

## 2022-07-06 PROCEDURE — 97161 PT EVAL LOW COMPLEX 20 MIN: CPT

## 2022-07-06 ASSESSMENT — PAIN SCALES - GENERAL: PAINLEVEL_OUTOF10: 3

## 2022-07-06 NOTE — PROGRESS NOTES
Fauzia Gates  : 1960  Primary: [id number]  Secondary:  [address] @ [address] [place] [id number]  Phone: [phone]  Fax: [phone] Plan Frequency: 3x/week for first 2 weeks, then 2x/week for next 4 weeks    Plan of Care/Certification Expiration Date: 22      PT Visit Info: 2}  Total # of Visits to Date: 1      OUTPATIENT PHYSICAL THERAPY:OP NOTE TYPE: Treatment Note 2022       Episode  }Appt Desk              Treatment Diagnosis:    Pain in Left Shoulder (M25.512)  Cervicalgia (M54.2)  Abnormal posture (R29.3  Medical/Referring Diagnosis:  No admission diagnoses are documented for this encounter. Referring Physician:  Marino Velez MD MD Orders:  PT Eval and Treat   Date of Onset:  Onset Date: 22     Allergies:   Patient has no known allergies. Restrictions/Precautions:  No data recordedNo data recorded   Interventions Planned (Treatment may consist of any combination of the following):    Current Treatment Recommendations: Strengthening; ROM; Functional mobility training; Endurance training; Manual Therapy - Soft Tissue Mobilization; Manual Therapy - Joint Manipulation; Pain management; Return to work related activity; Home exercise program; Modalities; Integrated dry needling; Therapeutic activities     Subjective Comments:}  See Eval  Initial:}    3/10 Post Session:       2/10  Medications Last Reviewed:  2022  Updated Objective Findings:  See evaluation note from today  Treatment         THERAPEUTIC EXERCISE: (20 minutes):    Exercises per grid below to improve mobility, strength, balance, and coordination. Progressed resistance and repetitions as indicated.      Date:  22 Date:   Date:     Activity/Exercise Parameters Parameters Parameters   Education HEP, prognosis, expectations     SNAGs Cervical Rotation 5xs 5 sec hold     Cervical Sidebending Stretch 5xs 5-10 sec hold     AROM Cervical Rotation 5xs 5 sec hold                            THERAPEUTIC ACTIVITY: ( 0 minutes): Therapeutic activities per grid below to improve mobility, strength, coordination, and dynamic movement of left arm, left hand, left neck and left shoulder to improve functional lifting, carrying, reaching, catching, and overhead activites. Date:   Date:   Date:     Activity/Exercise Parameters Parameters Parameters                                                 MANUAL THERAPY: (10 minutes):   Joint mobilization, Soft tissue mobilization, and Manipulation was utilized and necessary because of the patient's restricted joint motion, painful spasm, loss of articular motion, and restricted motion of soft tissue. Date  7/6/2022      Technique Used Grade Level # Time(s) Effect while being performed    Distraction  1-3  Cervical  3  Decreased in tension     Upglides  1-3  Left  5  Diminished radicular pattern     Upper Trapezius  Soft Tissue  Left 2  Decreased tension, reproduction of radicular pattern                                                      HEP Log Date    SNAGs Cervical Rotation 07/06/22   2. Cervical Sidebending Stretch 07/06/22   3. AROM Cervical Rotation 07/06/22   4.     5.        POC    Recertification Expiration Date  Plan of Care/Certification Expiration Date: 08/17/22   Visit Count  1    Number of Allowed Visits              Treatment/Session Summary:      Treatment Assessment:    See Eval   Communication/Consultation:        See Eval   Equipment provided today:    Recommendations/Intent for next  treatment session:  Next visit will focus on cervical ROM especially left rotation and sidebending. Manual as needed.            Total Treatment Billable Duration:  30 minutes   Time In: 7515  Time Out: [address]       {Charge Capture Post Session Pain  PT Visit Info  [facility] Portal  MD Guidelines  Scanned Media  Benefits  MyChart    Future Appointments   Date Time Provider Venessa Ko   7/11/2022  1:00

## 2022-07-06 NOTE — PLAN OF CARE
Shaylee Elmore  : 1960  Primary: Tony [address]  Secondary:  [address] @ [address] [facility] [address]  Phone: [phone]  Fax: [phone] Plan Frequency: 3x/week for first 2 weeks, then 2x/week for next 4 weeks    Plan of Care/Certification Expiration Date: 22      PT Visit Info: Total # of Visits to Date: 1      OUTPATIENT PHYSICAL THERAPY:OP NOTE TYPE: Initial Assessment 2022               Episode  Appt Desk         Treatment Diagnosis:    Pain in Left Shoulder (M25.512)  Cervicalgia (M54.2)  Abnormal posture (R29.3)  Medical/Referring Diagnosis:  No admission diagnoses are documented for this encounter. Referring Physician:  Elaine Douglas MD MD Orders:  PT Eval and Treat   Return MD Appt:   Date of Onset:  Onset Date: 22     Allergies:  Patient has no known allergies. Restrictions/Precautions:    No data recordedNo data recorded   Medications Last Reviewed:  2022     SUBJECTIVE   History of Injury/Illness (Reason for Referral):  Pt stated that the pain of his left shoulder and tingling/numbness down his left arm and hand started a little over 3 weeks ago. Pt stated that the symptoms have gotten progressively worse over the past 2 weeks. The [place] where he was driving a tractor when it jolted forward. This incident caused his body to vault forward, he reported some tingling/numbness down his left shoulder, arm, and hand and some achy-ness of his left shoulder. Pt stated that he has not loss anything strength in his shoulder, but just has tingling/numbness with certain motions such as left cervical side bending and rotation. Pt is having trouble sleeping and has to wake up throughout the night to change positions/ take medication. Pt is taking gabapentin which has helped his sleep. Pt stated laying flat on his back is bothersome and he has to sleep upright.  Pt stated after left upper extermity is irritated due to laying flat, he changes position and the pain reduces immediately. 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 Initial:     3/10 Post Session:     2/10  Past Medical History/Comorbidities:   Mr. Sreekanth Dupree  has a past medical history of Dyslipidemia, ED (erectile dysfunction), HSV-2 infection, Hypertension, Impaired fasting glucose, and Panic disorder. Mr. Sreekanth Dupree  has a past surgical history that includes Tonsillectomy. 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  ADL Assistance: Independent  Homemaking Assistance: Independent  Homemaking Responsibilities: No  Ambulation Assistance: Independent  Transfer Assistance: Independent  Active : Yes  Mode of Transportation: Car  No data recorded   Learning:   Does the patient/guardian have any barriers to learning?: No barriers  Will there be a co-learner?: No  What is the preferred language of the patient/guardian?: English  Is an  required?: No     Fall Risk Scale:    Total Score: 0  [place] Fall Risk: Low (0-24)           OBJECTIVE       Observation/Orthostatic Postural Assessment:    [] This section not tested    Observation   Forward Head  Rounded Shoulders  Head Tilt   Limited mobility         Range of Motion    [] This section not tested    AROM Degrees   Cervical Flexion WNL   Cervical Extension Unable to assess   Rotation Right 40    Rotation Left  30   Side-bending Right 25   Side-bending Left  10            Strength   [] This section not tested    Motion RIGHT LEFT   Shoulder Elevation 5/5 5/5    Shoulder Flexion 5/5 5/5    Shoulder ER 5/5 5/5    Shoulder IR 5/5 5/5          Special Test   [] This section not tested    Test/Result   Cervical Distraction: Negative  ULTT-Median: Negative  ULTT-Radial: Negative          Manual   [] This section not tested    Joint Directon Grade Treatment Effect   Cervical Spine  Upglides (left)  I-3 Diminished radicular pattern        Palpation:  Assessed @ Initial Visit: Tenderness to Left side upper trapezius (medial between proximal and distal attachment)     Neurological Screen   [x] This section not tested    Test/Result                     Functional Mobility    [x] This section not tested    Test Results   Carrying     Overhead Press     Lifting object off Floor                   ASSESSMENT   Initial Assessment:      Shaylee Elmore presents to physical therapy with decreased strength, ROM, joint mobility, functional mobility. These S/S are consistent with Cervicalgia. Patient will benefit from skilled physical therapy for manual therapeutic techniques (as appropriate), therapeutic exercises and activities, balance and comprehensive home exercises program to address current impairments and functional limitations. Body Structures, Functions, Activity Limitations Requiring Skilled Therapeutic Intervention: Decreased functional mobility ; Decreased ADL status; Decreased ROM; Decreased body mechanics; Decreased tolerance to work activity; Decreased strength; Decreased high-level IADLs;  Increased pain; Decreased posture     Therapy Prognosis:   Therapy Prognosis: Excellent     Assessment Complexity:   Low Complexity  PLAN   Effective Dates: 07/06/22 TO Plan of Care/Certification Expiration Date: 08/17/22     Frequency/Duration: Plan Frequency: 3x/week for first 2 weeks, then 2x/week for next 4 weeks     Interventions Planned (Treatment may consist of any combination of the following):    Current Treatment Recommendations: Strengthening; ROM; Functional mobility training; Endurance training; Manual Therapy - Soft Tissue Mobilization; Manual Therapy - Joint Manipulation; Pain management; Return to work related activity; Home exercise program; Modalities; Integrated dry needling; Therapeutic activities       Goals: (Goals have been discussed and agreed upon with patient.) In Progress Goal Met  Goal Not met   Short-Term Functional Goals: Time Frame: 3 weeks      1. Sebastian Fields will increase his left cervical rotation to less than or equal to 40 degrees in order to drive without compensations. [x] [] []   2. Sebastian Fields will be able to overhead press 30lbs in order to carry items overhead that may needed everyday. [x] [] []   3. Sebastian Fields will be  independent with HEP. [x] [] []         Discharge Goals: Time Frame: 6 weeks      1. 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 [x] [] []   2. Sebastian Fields will be able to rack carry 50lbs and walk 300 ft in order to carry various items short distances. [x] [] []   3. Sebastian Fields will be able to rack pull/deadlift 100lbs in order to maintain total body control/mechanics for both of his jobs. [x] [] []             Outcome Measure: Tool Used: Disabilities of the Arm, Shoulder and Hand (DASH) Questionnaire - Quick Version  Score:  Initial: 18/55  Most Recent: X/55 (Date: -- )   Interpretation of Score: The DASH is designed to measure the activities of daily living in person's with upper extremity dysfunction or pain. Each section is scored on a 1-5 scale, 5 representing the greatest disability. The scores of each section are added together for a total score of 55. Tool Used: Neck Disability Index (NDI)  Score:  Initial: 4/50  Most Recent: X/50 (Date: -- )   Interpretation of Score:  The Neck Disability Index is a revised form of the Oswestry Low Back Pain Index and is designed to measure the activities of daily living in person's with neck pain. Each section is scored on a 0-5 scale, 5 representing the greatest disability. The scores of each section are added together for a total score of 50. Medical Necessity:     Pauly Dalton will benefit from skilled physical therapy (medically necessary) to address above deficits affecting participation in basic ADLs and functional mobility/tolerance. Patient will benefit from manual therapeutic techniques (stretching, joint mobilizations, soft tissue mobilization/myofascial release), therapeutic exercises and activities, postural strengthening/education, and comprehensive home exercises program to address current impairments and functional limitations. Reason For Services/Other Comments:    Patient continues to require skilled intervention due to patient continues to present with impairments assessed at initial evaluation and requiring skilled physical therapy to meet goals for PT. Total Duration:  Time In: 0950  Time Out: 80    Regarding Herrera Brown's therapy, I certify that the treatment plan above will be carried out by a therapist or under their direction.   Thank you for this referral,  SHAHRAM TONEY     Referring Physician Signature: Gloria Reynoso MD                    Post Session Pain  Charge Capture  PT Visit Info  POC Link  Treatment Note Link  MD Guidelines  [facility]

## 2022-07-11 ENCOUNTER — HOSPITAL ENCOUNTER (OUTPATIENT)
Dept: PHYSICAL THERAPY | Age: 62
Setting detail: RECURRING SERIES
Discharge: HOME | End: 2022-07-14
Payer: COMMERCIAL

## 2022-07-11 ENCOUNTER — APPOINTMENT (OUTPATIENT)
Dept: PHYSICAL THERAPY | Age: 62
End: 2022-07-11
Payer: COMMERCIAL

## 2022-07-11 PROCEDURE — 97012 MECHANICAL TRACTION THERAPY: CPT

## 2022-07-11 PROCEDURE — 97110 THERAPEUTIC EXERCISES: CPT

## 2022-07-11 PROCEDURE — 97140 MANUAL THERAPY 1/> REGIONS: CPT

## 2022-07-11 ASSESSMENT — PAIN SCALES - GENERAL: PAINLEVEL_OUTOF10: 3

## 2022-07-11 NOTE — PROGRESS NOTES
Tamiko Starks  : 1960  Primary: Tony [address]  Secondary:  Uzma De La Rosa @ [address] [facility] [address]  Phone: [phone]  Fax: [phone] Plan Frequency: 3x/week for first 2 weeks, then 2x/week for next 4 weeks    Plan of Care/Certification Expiration Date: 22      PT Visit Info: 2}  Total # of Visits to Date: 2      OUTPATIENT PHYSICAL THERAPY:OP NOTE TYPE: Treatment Note 2022       Episode  }Appt Desk              Treatment Diagnosis:    Pain in Left Shoulder (M25.512)  Cervicalgia (M54.2)  Abnormal posture (R29.3  Medical/Referring Diagnosis:  No admission diagnoses are documented for this encounter. Referring Physician:  Nilo Gant MD MD Orders:  PT Eval and Treat   Date of Onset:  Onset Date: 22     Allergies:   Patient has no known allergies. Restrictions/Precautions:  No data recordedNo data recorded   Interventions Planned (Treatment may consist of any combination of the following):    Current Treatment Recommendations: Strengthening; ROM; Functional mobility training; Endurance training; Manual Therapy - Soft Tissue Mobilization; Manual Therapy - Joint Manipulation; Pain management; Return to work related activity; Home exercise program; Modalities; Integrated dry needling; Therapeutic activities     Subjective Comments:}  Pt reported radicular symptoms are not getting worse. Pt stated that he has been doing his HEP and his reported symptoms feel better after them. Initial:}    310 Post Session:       0/10  Medications Last Reviewed:  2022  Updated Objective Findings:  None Today  Treatment         THERAPEUTIC EXERCISE: (15 minutes):    Exercises per grid below to improve mobility, strength, balance, and coordination. Progressed resistance and repetitions as indicated.      Date:  22 Date:  22 Date:     Activity/Exercise Parameters Parameters Parameters   Education HEP, prognosis, expectations New exercises, expectations, centralization/peripheralization     SNAGs Cervical Rotation 5xs 5 sec hold     Cervical Sidebending Stretch 5xs 5-10 sec hold     AROM Cervical Rotation 5xs 5 sec hold      Postural (retraction)  + cervical rotation 3x5 2 sec hold     Open Book (sidelying)  3x5     Self-STM with lacrosse ball (infraspinatus)  4 min         THERAPEUTIC ACTIVITY: ( 0 minutes): Therapeutic activities per grid below to improve mobility, strength, coordination, and dynamic movement of left arm, left hand, left neck and left shoulder to improve functional lifting, carrying, reaching, catching, and overhead activites. Date:   Date:   Date:     Activity/Exercise Parameters Parameters Parameters                                                 MANUAL THERAPY: (10 minutes):   Joint mobilization, Soft tissue mobilization, and Manipulation was utilized and necessary because of the patient's restricted joint motion, painful spasm, loss of articular motion, and restricted motion of soft tissue. Date  7/11/2022      Technique Used Grade Level # Time(s) Effect while being performed    Distraction  1-3  Cervical  0  Decreased in tension     Upglides  1-3  Left & Right  6  Diminished radicular pattern on left side upglides    Upper Trapezius  Soft Tissue  Left 2  Decreased tension, reproduction of radicular pattern     Manipulation  5  CT Junction 2  Diminished radicular pattern                                         Mechanical traction: 20 minutes        Cervical/Lumbar Position Weight range Static/Intermittent Total Time   Cervical  [place] 10-30lbs Intermittent 60:20 20          HEP Log Date   1. SNAGs Cervical Rotation 07/06/22   2. Cervical Sidebending Stretch 07/06/22   3.  AROM Cervical Rotation 07/06/22   4.     5.        POC    Recertification Expiration Date  Plan of Care/Certification Expiration Date: 08/17/22   Visit Count  2    Number of Allowed Visits              Treatment/Session Summary:      Treatment Assessment:    Pt focused on centralizing pain through manual therapy via upglides, mechanical traction, and manual CT junction manipulation. Pt's reported symptoms subsided with mechanical traction and manual CT junction manipulation. Pt's reported symptoms were relieved during mechanical traction due to head position being flexed. Pt worked on postural exercises to increase mobility.      Communication/Consultation:       HEP, centralization/peripheralization in the coming days   Equipment provided today: None   Recommendations/Intent for next  treatment session:  Next visit will focus on centralizing symptoms and cervical mobility            Total Treatment Billable Duration:  45 minutes   Time In: 1250  Time Out: [address] [facility]  PT Visit Info  [facility] Portal  MD [address]    Future Appointments   Date Time Provider Vneessa Ko   7/13/2022  9:45 AM Avelino Vora, PT [facility]   7/15/2022 11:15 AM Avelino Vora, PT [facility]   7/18/2022  1:00 PM Avelino Vora, PT [facility]   7/19/2022 10:00 AM DORIE Issa MD [facility]   7/20/2022  9:45 AM Avelino Vora, PT [facility]   7/22/2022 10:30 AM Avelino Vora, PT [facility]   7/27/2022  9:45 AM Avelino Vora, PT [facility]   7/29/2022 10:30 AM Avelino Vora, PT [facility]   8/3/2022  9:45 AM Avelino Vora, PT [facility]   8/5/2022 11:15 AM Avelino Vora, PT [facility]   8/10/2022  9:45 AM Avelino Vora, PT [facility]   8/12/2022 11:15 AM Avelino Vora, PT [facility]   8/17/2022  9:45 AM Avelino Vora, PT [facility]   8/19/2022 11:15 AM Avelino Vora, PT [facility]   1/3/2023  8:20 AM Jose Miguel Coker MD [facility]

## 2022-07-13 ENCOUNTER — HOSPITAL ENCOUNTER (OUTPATIENT)
Dept: PHYSICAL THERAPY | Age: 62
Setting detail: RECURRING SERIES
Discharge: HOME | End: 2022-07-16
Payer: COMMERCIAL

## 2022-07-13 PROCEDURE — 97110 THERAPEUTIC EXERCISES: CPT

## 2022-07-13 PROCEDURE — 97140 MANUAL THERAPY 1/> REGIONS: CPT

## 2022-07-13 ASSESSMENT — PAIN SCALES - GENERAL: PAINLEVEL_OUTOF10: 0

## 2022-07-13 NOTE — PROGRESS NOTES
Fauzia Gates  : 1960  Primary: Tony [address]2  Secondary:  [address] @ [address] [facility] [address]  Phone: [phone]  Fax: [phone] Plan Frequency: 3x/week for first 2 weeks, then 2x/week for next 4 weeks    Plan of Care/Certification Expiration Date: 22      PT Visit Info: 2}  Total # of Visits to Date: 2      OUTPATIENT PHYSICAL THERAPY:OP NOTE TYPE: Treatment Note 2022       Episode  }Appt Desk              Treatment Diagnosis:    Pain in Left Shoulder (M25.512)  Cervicalgia (M54.2)  Abnormal posture (R29.3  Medical/Referring Diagnosis:  No admission diagnoses are documented for this encounter. Referring Physician:  Marino Velez MD MD Orders:  PT Eval and Treat   Date of Onset:  Onset Date: 22     Allergies:   Patient has no known allergies. Restrictions/Precautions:  No data recordedNo data recorded   Interventions Planned (Treatment may consist of any combination of the following):    Current Treatment Recommendations: Strengthening; ROM; Functional mobility training; Endurance training; Manual Therapy - Soft Tissue Mobilization; Manual Therapy - Joint Manipulation; Pain management; Return to work related activity; Home exercise program; Modalities; Integrated dry needling; Therapeutic activities     Subjective Comments:}  Pt reported no change    Initial:}    0/10 Post Session:       0/10  Medications Last Reviewed:  2022  Updated Objective Findings:  None Today  Treatment         THERAPEUTIC EXERCISE: (15 minutes):    Exercises per grid below to improve mobility, strength, balance, and coordination. Progressed resistance and repetitions as indicated.      Date:  22 Date:  22 Date:  2022   Activity/Exercise Parameters Parameters Parameters   Education HEP, prognosis, expectations New exercises, expectations, centralization/peripheralization     SNAGs Cervical Rotation 5xs 5 sec hold Cervical Sidebending Stretch 5xs 5-10 sec hold     AROM Cervical Rotation 5xs 5 sec hold      Postural (retraction)  + cervical rotation 3x5 2 sec hold     Self-STM with lacrosse ball (infraspinatus)  4 min     Thoracic extension   4 min   Snow angels   2 min   Lat pull downs   15xs 53lbs   Ring stretch   4 min       THERAPEUTIC ACTIVITY: ( 0 minutes): Therapeutic activities per grid below to improve mobility, strength, coordination, and dynamic movement of left arm, left hand, left neck and left shoulder to improve functional lifting, carrying, reaching, catching, and overhead activites. Date:   Date:   Date:     Activity/Exercise Parameters Parameters Parameters                                                 MANUAL THERAPY: (27 minutes):   Joint mobilization, Soft tissue mobilization, and Manipulation was utilized and necessary because of the patient's restricted joint motion, painful spasm, loss of articular motion, and restricted motion of soft tissue. Date  7/13/2022      Technique Used Grade Level # Time(s) Effect while being performed                  Upper Trapezius  Soft Tissue  Left 2  Decreased tension, reproduction of radicular pattern     Manipulation  5  CT Junction 2  Diminished radicular pattern    soft tissue    posterior shoulder  21 min  ischemic compresison    PA thoracic  Iv, V   2                    Mechanical traction: 0 minutes        Cervical/Lumbar Position Weight range Static/Intermittent Total Time                HEP Log Date   1. SNAGs Cervical Rotation 07/06/22   2. Cervical Sidebending Stretch 07/06/22   3.  AROM Cervical Rotation 07/06/22   4.     5.        POC    Recertification Expiration Date      Visit Count  3    Number of Allowed Visits              Treatment/Session Summary:      Treatment Assessment:    Mild soreness post treatment and had subjective feelsing of increase mobility post treatment    Communication/Consultation:       HEP, centralization/peripheralization in the coming days   Equipment provided today: None   Recommendations/Intent for next  treatment session:  Next visit will focus on centralizing symptoms and cervical mobility            Total Treatment Billable Duration:  45 minutes   Time In: 0945  Time Out: [address] [address], PT       {Charge Capture Post Session Pain  PT Visit Info  MedBridge Portal  MD [address]    Future Appointments   Date Time Provider Venessa Stefani   7/15/2022 11:15 AM Donell Gula, PT [facility]   7/18/2022  1:00 PM Donell Gula, PT [facility]   7/19/2022 10:00 AM DORIE Oropeza MD [facility]   7/20/2022  9:45 AM Donell Gula, PT [facility]   7/22/2022 10:30 AM Donell Gula, PT [facility]   7/27/2022  9:45 AM Donell Gula, PT [facility]   7/29/2022 10:30 AM Donell Gula, PT [facility]   8/3/2022  9:45 AM Donell Gula, PT [facility]   8/5/2022 11:15 AM Donell Gula, PT [facility]   8/10/2022  9:45 AM Donell Gula, PT [facility]   8/12/2022 11:15 AM Donell Gula, PT [facility]   8/17/2022  9:45 AM Donell Gula, PT [facility]   8/19/2022 11:15 AM Donell Gula, PT [facility]   1/3/2023  8:20 AM Mono Kay MD [facility]

## 2022-07-15 ENCOUNTER — HOSPITAL ENCOUNTER (OUTPATIENT)
Dept: PHYSICAL THERAPY | Age: 62
Setting detail: RECURRING SERIES
Discharge: HOME | End: 2022-07-18
Payer: COMMERCIAL

## 2022-07-15 PROCEDURE — 97110 THERAPEUTIC EXERCISES: CPT

## 2022-07-15 PROCEDURE — 97140 MANUAL THERAPY 1/> REGIONS: CPT

## 2022-07-15 ASSESSMENT — PAIN SCALES - GENERAL: PAINLEVEL_OUTOF10: 0

## 2022-07-15 NOTE — PROGRESS NOTES
Lexx Solano  : 1960  Primary: Tony Richardson  Secondary:  Felecia Cardona @ [address] [facility] [address]  Phone: [phone]  Fax: [phone] Plan Frequency: 3x/week for first 2 weeks, then 2x/week for next 4 weeks    Plan of Care/Certification Expiration Date: 22      PT Visit Info: 2}  Total # of Visits to Date: 4      OUTPATIENT PHYSICAL THERAPY:OP NOTE TYPE: Treatment Note 7/15/2022       Episode  }Appt Desk              Treatment Diagnosis:    Pain in Left Shoulder (M25.512)  Cervicalgia (M54.2)  Abnormal posture (R29.3  Medical/Referring Diagnosis:  No admission diagnoses are documented for this encounter. Referring Physician:  Mojgan Clemons MD MD Orders:  PT Eval and Treat   Date of Onset:  Onset Date: 22     Allergies:   Patient has no known allergies. Restrictions/Precautions:  No data recordedNo data recorded   Interventions Planned (Treatment may consist of any combination of the following):    Current Treatment Recommendations: Strengthening; ROM; Functional mobility training; Endurance training; Manual Therapy - Soft Tissue Mobilization; Manual Therapy - Joint Manipulation; Pain management; Return to work related activity; Home exercise program; Modalities; Integrated dry needling; Therapeutic activities     Subjective Comments:}  Pt was late to appointment today due to scheduling error. o chnage in symptoms. Initial:}    0/10 Post Session:       0/10  Medications Last Reviewed:  7/15/2022  Updated Objective Findings:  None Today  Treatment         THERAPEUTIC EXERCISE: (8 minutes):    Exercises per grid below to improve mobility, strength, balance, and coordination. Progressed resistance and repetitions as indicated.      Date:  22 Date:  2022 Date  7/15/2022   Activity/Exercise Parameters Parameters    Education New exercises, expectations, centralization/peripheralization   Thoracic outlet edu   SNAGs Cervical Rotation      Cervical Sidebending Stretch      AROM Cervical Rotation      Postural (retraction) + cervical rotation 3x5 2 sec hold      Self-STM with lacrosse ball (infraspinatus) 4 min      Thoracic extension  4 min    Snow angels  2 min    Lat pull downs  15xs 53lbs    Ring stretch  4 min    First rib stretch   4 min       THERAPEUTIC ACTIVITY: ( 0 minutes): Therapeutic activities per grid below to improve mobility, strength, coordination, and dynamic movement of left arm, left hand, left neck and left shoulder to improve functional lifting, carrying, reaching, catching, and overhead activites. Date:   Date:   Date:     Activity/Exercise Parameters Parameters Parameters                                                 MANUAL THERAPY: (17 minutes):   Joint mobilization, Soft tissue mobilization, and Manipulation was utilized and necessary because of the patient's restricted joint motion, painful spasm, loss of articular motion, and restricted motion of soft tissue. Date  7/15/2022      Technique Used Grade Level # Time(s) Effect while being performed                  Upper Trapezius  Soft Tissue  Left 2  Decreased tension, reproduction of radicular pattern     Manipulation  5  CT Junction 2  Diminished radicular pattern    soft tissue    posterior shoulder  10  ischemic compresison   First rib mobilizaiton  Iv, V   3                   Mechanical traction: 0 minutes        Cervical/Lumbar Position Weight range Static/Intermittent Total Time                HEP Log Date    SNAGs Cervical Rotation 07/06/22   2. Cervical Sidebending Stretch 07/06/22   3. AROM Cervical Rotation 07/06/22   4.     5.        POC    Recertification Expiration Date  Plan of Care/Certification Expiration Date: 08/17/22   Visit Count  4    Number of Allowed Visits              Treatment/Session Summary:      Treatment Assessment:    Pt had tenderness in scalenes and had mild refering pain down the arm. Communication/Consultation:        HEP, centralization/peripheralization in the coming days   Equipment provided today: None   Recommendations/Intent for next  treatment session:  Next visit will focus on centralizing symptoms and cervical mobility            Total Treatment Billable Duration:  25 minutes   Time In: 1138  Time Out: [address], PT       {Charge Capture Post Session Pain  PT Visit Info  [facility]  MD [address]    Future Appointments   Date Time Provider Venessa Ko   7/18/2022  1:00 PM Nonda [place], PT [facility]   7/19/2022 10:00 AM DORIE Wolfe MD [facility]   7/20/2022  9:45 AM Nonda [place], PT [facility]   7/22/2022 10:30 AM Nonda [place], PT [facility]   7/27/2022  9:45 AM Nonda [place], PT [facility]   7/29/2022 10:30 AM Nonda [place], PT [facility]   8/3/2022  9:45 AM Nonda [place], PT [facility]   8/5/2022 11:15 AM Nonda [place], PT [facility]   8/10/2022  9:45 AM Nonda [place], PT [facility]   8/12/2022 11:15 AM Nonda [place], PT [facility]   8/17/2022  9:45 AM Nonda [place], PT [facility]   8/19/2022 11:15 AM Nonda [place], PT [facility]   1/3/2023  8:20 AM Gus Choudhury MD [facility]

## 2022-07-18 ENCOUNTER — HOSPITAL ENCOUNTER (OUTPATIENT)
Dept: PHYSICAL THERAPY | Age: 62
Setting detail: RECURRING SERIES
Discharge: HOME | End: 2022-07-21
Payer: COMMERCIAL

## 2022-07-18 PROCEDURE — 97110 THERAPEUTIC EXERCISES: CPT

## 2022-07-18 NOTE — PROGRESS NOTES
Judy Villa  : 1960  Primary: Tony [id number]  Secondary:  [address] @ [address] [facility] [address]  Phone: [phone]  Fax: [phone] Plan Frequency: 3x/week for first 2 weeks, then 2x/week for next 4 weeks    Plan of Care/Certification Expiration Date: 22      PT Visit Info: 2}  Total # of Visits to Date: 4      OUTPATIENT PHYSICAL THERAPY:OP NOTE TYPE: Treatment Note 2022       Episode  }Appt Desk              Treatment Diagnosis:    Pain in Left Shoulder (M25.512)  Cervicalgia (M54.2)  Abnormal posture (R29.3  Medical/Referring Diagnosis:  No admission diagnoses are documented for this encounter. Referring Physician:  Arlene Rendon MD MD Orders:  PT Eval and Treat   Date of Onset:  Onset Date: 22     Allergies:   Patient has no known allergies. Restrictions/Precautions:  No data recordedNo data recorded   Interventions Planned (Treatment may consist of any combination of the following):    Current Treatment Recommendations: Strengthening; ROM; Functional mobility training; Endurance training; Manual Therapy - Soft Tissue Mobilization; Manual Therapy - Joint Manipulation; Pain management; Return to work related activity; Home exercise program; Modalities; Integrated dry needling; Therapeutic activities     Subjective Comments:}  improved sleep for the last two nights    Initial:}     /10 Post Session:        /10  Medications Last Reviewed:  2022  Updated Objective Findings:  None Today  Treatment         THERAPEUTIC EXERCISE: (45 minutes):    Exercises per grid below to improve mobility, strength, balance, and coordination. Progressed resistance and repetitions as indicated.      Date:  22 Date:  2022 Date  7/15/2022 Date  2022   Activity/Exercise Parameters Parameters     Education New exercises, expectations, centralization/peripheralization   Thoracic outlet edu    SNAGs Cervical Rotation Cervical Sidebending Stretch       AROM Cervical Rotation       Postural (retraction) + cervical rotation 3x5 2 sec hold       Self-STM with lacrosse ball (infraspinatus) 4 min       Thoracic extension  4 min     Snow angels  2 min  Against wall working on posture   Lat pull downs  15xs 53lbs  60 lbs 3x10   Ring stretch  4 min  15xs   First rib stretch   4 min 3 min   Punch progressions    30xs grrb band       THERAPEUTIC ACTIVITY: ( 0 minutes): Therapeutic activities per grid below to improve mobility, strength, coordination, and dynamic movement of left arm, left hand, left neck and left shoulder to improve functional lifting, carrying, reaching, catching, and overhead activites. Date:   Date:   Date:     Activity/Exercise Parameters Parameters Parameters                                                 MANUAL THERAPY: (0 minutes):   Joint mobilization, Soft tissue mobilization, and Manipulation was utilized and necessary because of the patient's restricted joint motion, painful spasm, loss of articular motion, and restricted motion of soft tissue. Date  7/18/2022      Technique Used Grade Level # Time(s) Effect while being performed                                                           Mechanical traction: 0 minutes        Cervical/Lumbar Position Weight range Static/Intermittent Total Time                HEP Log Date    SNAGs Cervical Rotation 07/06/22   2. Cervical Sidebending Stretch 07/06/22   3.  AROM Cervical Rotation 07/06/22   4.     5.        POC    Recertification Expiration Date      Visit Count  5    Number of Allowed Visits              Treatment/Session Summary:      Treatment Assessment:    Focused on active motion and improving flexibility    Communication/Consultation:        HEP, centralization/peripheralization in the coming days   Equipment provided today: None   Recommendations/Intent for next  treatment session:  Next visit will focus on centralizing symptoms and cervical mobility            Total Treatment Billable Duration:  45 minutes   Time In: 1300  Time Out: 1345    Bj Garcia, PT       {Charge Capture Post Session Pain  PT Visit Info  [facility] Portal  MD Guidelines  Scanned Media  Benefits  MyChart    Future Appointments   Date Time Provider Venessa Stefani   7/22/2022 10:30 AM Bjnasir Garcia, PT [facility]   7/27/2022  9:45 AM Bj Garcia, PT [facility]   7/29/2022 10:30 AM Bj Garcia, PT [facility]   8/3/2022  9:45 AM Bj Garcia, PT [facility]   8/5/2022 11:15 AM Bj Garcia, PT [facility]   8/10/2022  9:45 AM Bj Garcia, PT [facility]   8/12/2022 11:15 AM Bj Garcia, PT [facility]   8/17/2022  9:45 AM Bj Garcia, PT [facility]   8/19/2022 11:15 AM Bj Garcia, PT [facility]   1/3/2023  8:20 AM Efren Mcarthur MD [facility]

## 2022-07-20 ENCOUNTER — APPOINTMENT (OUTPATIENT)
Dept: PHYSICAL THERAPY | Age: 62
End: 2022-07-20
Payer: COMMERCIAL

## 2022-07-22 ENCOUNTER — TELEPHONE (OUTPATIENT)
Dept: INTERNAL MEDICINE CLINIC | Facility: CLINIC | Age: 62
End: 2022-07-22

## 2022-07-22 DIAGNOSIS — I10 PRIMARY HYPERTENSION: Primary | ICD-10-CM

## 2022-07-22 RX ORDER — LISINOPRIL 10 MG/1
10 TABLET ORAL DAILY
Qty: 90 TABLET | Refills: 3 | Status: SHIPPED | OUTPATIENT
Start: 2022-07-22

## 2022-07-29 ENCOUNTER — APPOINTMENT (OUTPATIENT)
Dept: PHYSICAL THERAPY | Age: 62
End: 2022-07-29
Payer: COMMERCIAL

## 2022-08-04 PROBLEM — Z12.5 PROSTATE CANCER SCREENING: Status: RESOLVED | Noted: 2021-05-14 | Resolved: 2022-08-04

## 2022-08-04 PROBLEM — Z12.11 COLON CANCER SCREENING: Status: RESOLVED | Noted: 2021-05-14 | Resolved: 2022-08-04

## 2022-09-13 DIAGNOSIS — J01.00 ACUTE NON-RECURRENT MAXILLARY SINUSITIS: Primary | ICD-10-CM

## 2022-09-13 RX ORDER — AMOXICILLIN AND CLAVULANATE POTASSIUM 875; 125 MG/1; MG/1
1 TABLET, FILM COATED ORAL 2 TIMES DAILY
Qty: 20 TABLET | Refills: 0 | Status: SHIPPED | OUTPATIENT
Start: 2022-09-13 | End: 2022-09-23

## 2023-03-24 DIAGNOSIS — Z12.5 PROSTATE CANCER SCREENING: ICD-10-CM

## 2023-03-24 DIAGNOSIS — F41.0 PANIC DISORDER: ICD-10-CM

## 2023-03-24 DIAGNOSIS — F51.01 PRIMARY INSOMNIA: ICD-10-CM

## 2023-03-24 DIAGNOSIS — I10 PRIMARY HYPERTENSION: Primary | ICD-10-CM

## 2023-03-24 DIAGNOSIS — R73.01 IMPAIRED FASTING GLUCOSE: ICD-10-CM

## 2023-03-24 DIAGNOSIS — E78.5 DYSLIPIDEMIA: ICD-10-CM

## 2023-03-24 RX ORDER — PAROXETINE 10 MG/1
10 TABLET, FILM COATED ORAL DAILY
Qty: 90 TABLET | Refills: 1 | Status: SHIPPED | OUTPATIENT
Start: 2023-03-24

## 2023-03-24 RX ORDER — TRAZODONE HYDROCHLORIDE 50 MG/1
50 TABLET ORAL NIGHTLY
Qty: 90 TABLET | Refills: 1 | Status: SHIPPED | OUTPATIENT
Start: 2023-03-24

## 2023-03-27 ENCOUNTER — TELEPHONE (OUTPATIENT)
Dept: INTERNAL MEDICINE CLINIC | Facility: CLINIC | Age: 63
End: 2023-03-27

## 2023-03-27 NOTE — TELEPHONE ENCOUNTER
----- Message from Maddie Sheridan MD sent at 3/24/2023  8:43 AM EDT -----  Regarding: Needs appointment  Please call patient. He is due for an annual this July with labs before appointment. Please schedule. I entered orders for labs.

## 2023-09-17 DIAGNOSIS — F51.01 PRIMARY INSOMNIA: ICD-10-CM

## 2023-09-18 RX ORDER — TRAZODONE HYDROCHLORIDE 50 MG/1
50 TABLET ORAL NIGHTLY
Qty: 90 TABLET | Refills: 1 | OUTPATIENT
Start: 2023-09-18

## 2023-09-25 ENCOUNTER — PATIENT MESSAGE (OUTPATIENT)
Dept: INTERNAL MEDICINE CLINIC | Facility: CLINIC | Age: 63
End: 2023-09-25

## 2023-09-25 DIAGNOSIS — I10 PRIMARY HYPERTENSION: ICD-10-CM

## 2023-09-25 RX ORDER — LISINOPRIL 10 MG/1
10 TABLET ORAL DAILY
Qty: 90 TABLET | Refills: 0 | Status: SHIPPED | OUTPATIENT
Start: 2023-09-25 | End: 2023-10-31 | Stop reason: ALTCHOICE

## 2023-10-25 DIAGNOSIS — E78.5 DYSLIPIDEMIA: ICD-10-CM

## 2023-10-25 DIAGNOSIS — I10 PRIMARY HYPERTENSION: ICD-10-CM

## 2023-10-25 DIAGNOSIS — Z12.5 PROSTATE CANCER SCREENING: ICD-10-CM

## 2023-10-25 DIAGNOSIS — R73.01 IMPAIRED FASTING GLUCOSE: ICD-10-CM

## 2023-10-25 LAB
ALBUMIN SERPL-MCNC: 4.1 G/DL (ref 3.2–4.6)
ALBUMIN/GLOB SERPL: 1.4 (ref 0.4–1.6)
ALP SERPL-CCNC: 73 U/L (ref 50–136)
ALT SERPL-CCNC: 50 U/L (ref 12–65)
ANION GAP SERPL CALC-SCNC: 1 MMOL/L (ref 2–11)
AST SERPL-CCNC: 25 U/L (ref 15–37)
BILIRUB SERPL-MCNC: 0.4 MG/DL (ref 0.2–1.1)
BUN SERPL-MCNC: 15 MG/DL (ref 8–23)
CALCIUM SERPL-MCNC: 9.4 MG/DL (ref 8.3–10.4)
CHLORIDE SERPL-SCNC: 107 MMOL/L (ref 101–110)
CHOLEST SERPL-MCNC: 176 MG/DL
CO2 SERPL-SCNC: 29 MMOL/L (ref 21–32)
CREAT SERPL-MCNC: 1.2 MG/DL (ref 0.8–1.5)
EST. AVERAGE GLUCOSE BLD GHB EST-MCNC: 123 MG/DL
GLOBULIN SER CALC-MCNC: 3 G/DL (ref 2.8–4.5)
GLUCOSE SERPL-MCNC: 107 MG/DL (ref 65–100)
HBA1C MFR BLD: 5.9 % (ref 4.8–5.6)
HDLC SERPL-MCNC: 30 MG/DL (ref 40–60)
HDLC SERPL: 5.9
LDLC SERPL CALC-MCNC: 117 MG/DL
POTASSIUM SERPL-SCNC: 4.3 MMOL/L (ref 3.5–5.1)
PROT SERPL-MCNC: 7.1 G/DL (ref 6.3–8.2)
PSA SERPL-MCNC: 0.4 NG/ML
SODIUM SERPL-SCNC: 137 MMOL/L (ref 133–143)
TRIGL SERPL-MCNC: 145 MG/DL (ref 35–150)
VLDLC SERPL CALC-MCNC: 29 MG/DL (ref 6–23)

## 2023-10-31 ENCOUNTER — OFFICE VISIT (OUTPATIENT)
Dept: INTERNAL MEDICINE CLINIC | Facility: CLINIC | Age: 63
End: 2023-10-31
Payer: COMMERCIAL

## 2023-10-31 VITALS
DIASTOLIC BLOOD PRESSURE: 70 MMHG | HEIGHT: 69 IN | BODY MASS INDEX: 32.88 KG/M2 | SYSTOLIC BLOOD PRESSURE: 138 MMHG | WEIGHT: 222 LBS | HEART RATE: 96 BPM

## 2023-10-31 DIAGNOSIS — Z12.5 PROSTATE CANCER SCREENING: Chronic | ICD-10-CM

## 2023-10-31 DIAGNOSIS — F41.0 GENERALIZED ANXIETY DISORDER WITH PANIC ATTACKS: ICD-10-CM

## 2023-10-31 DIAGNOSIS — M54.12 CERVICAL RADICULOPATHY: ICD-10-CM

## 2023-10-31 DIAGNOSIS — Z12.11 COLON CANCER SCREENING: Chronic | ICD-10-CM

## 2023-10-31 DIAGNOSIS — I10 HYPERTENSION, ESSENTIAL: Primary | ICD-10-CM

## 2023-10-31 DIAGNOSIS — F41.1 GENERALIZED ANXIETY DISORDER WITH PANIC ATTACKS: ICD-10-CM

## 2023-10-31 DIAGNOSIS — R73.01 IMPAIRED FASTING GLUCOSE: ICD-10-CM

## 2023-10-31 DIAGNOSIS — G25.0 ESSENTIAL TREMOR: ICD-10-CM

## 2023-10-31 DIAGNOSIS — Z23 ENCOUNTER FOR IMMUNIZATION: ICD-10-CM

## 2023-10-31 DIAGNOSIS — E78.2 HYPERLIPIDEMIA, MIXED: ICD-10-CM

## 2023-10-31 DIAGNOSIS — N40.1 BENIGN PROSTATIC HYPERPLASIA WITH WEAK URINARY STREAM: ICD-10-CM

## 2023-10-31 DIAGNOSIS — R39.12 BENIGN PROSTATIC HYPERPLASIA WITH WEAK URINARY STREAM: ICD-10-CM

## 2023-10-31 PROCEDURE — 99214 OFFICE O/P EST MOD 30 MIN: CPT | Performed by: INTERNAL MEDICINE

## 2023-10-31 PROCEDURE — 3075F SYST BP GE 130 - 139MM HG: CPT | Performed by: INTERNAL MEDICINE

## 2023-10-31 PROCEDURE — 3078F DIAST BP <80 MM HG: CPT | Performed by: INTERNAL MEDICINE

## 2023-10-31 RX ORDER — PROPRANOLOL HYDROCHLORIDE 80 MG/1
80 CAPSULE, EXTENDED RELEASE ORAL DAILY
Qty: 30 CAPSULE | Refills: 3 | Status: SHIPPED | OUTPATIENT
Start: 2023-10-31

## 2023-10-31 ASSESSMENT — PATIENT HEALTH QUESTIONNAIRE - PHQ9
SUM OF ALL RESPONSES TO PHQ QUESTIONS 1-9: 0
SUM OF ALL RESPONSES TO PHQ QUESTIONS 1-9: 0
SUM OF ALL RESPONSES TO PHQ9 QUESTIONS 1 & 2: 0
SUM OF ALL RESPONSES TO PHQ QUESTIONS 1-9: 0
SUM OF ALL RESPONSES TO PHQ QUESTIONS 1-9: 0
2. FEELING DOWN, DEPRESSED OR HOPELESS: 0
1. LITTLE INTEREST OR PLEASURE IN DOING THINGS: 0

## 2023-10-31 ASSESSMENT — ENCOUNTER SYMPTOMS
EYE PAIN: 0
VOICE CHANGE: 0
RECTAL PAIN: 0
CHOKING: 0
STRIDOR: 0

## 2023-10-31 NOTE — PROGRESS NOTES
FOLLOWUP VISIT    Subjective:    Mr. Jason Tavera is a 66 y.o., male,   Chief Complaint   Patient presents with    Annual Exam    Follow-up     Intermittent left side of neck radiating to the shoulder, worse with movement. HPI:    Patient presents today for follow up of two or more chronic medical problems and review of labs. The patient has impaired fasting glucose tolerance. The patient remains on attempted diet exercise and weight loss therapy. The patient denies any symptoms of hyperglycemia. The patient has hypertension. The patient has been on an attempted low sodium diet and has been trying to exercise and maintain a healthy weight. The patient reports good compliance with the blood pressure medications. States his home BP averages 130/70. Feels he has white coat effect. He continues to have waxing and waning left upper extremity radicular symptoms with pain radiating down his left arm into his hand. Physical therapy helped initially but then pain returned. No weakness or sensory loss. Patient would like to see a specialist.      His generalized anxiety is controlled with paroxetine. Rare clonazepam use to abort panic attacks. He decided to not try Flomax and has been living with mild BPH symptoms. Also complains of a tremor with certain activity such as drinking from cup and brushing teeth. Tremor worse when under stress. Tremor resolves after a glass of wine.       The following portions of the patient's history were reviewed and updated as appropriate:      Past Medical History:   Diagnosis Date    Dyslipidemia     ED (erectile dysfunction)     HSV-2 infection     Hypertension     Impaired fasting glucose     Panic disorder        Past Surgical History:   Procedure Laterality Date    TONSILLECTOMY         Family History   Problem Relation Age of Onset    Breast Cancer Mother     Diabetes Father     Hypertension Father     Cancer Maternal Grandfather         stomach

## 2023-11-01 ENCOUNTER — PATIENT MESSAGE (OUTPATIENT)
Dept: INTERNAL MEDICINE CLINIC | Facility: CLINIC | Age: 63
End: 2023-11-01

## 2023-11-01 NOTE — TELEPHONE ENCOUNTER
From: Thomas Daigle  To: Dr. Andersen Ship: 11/1/2023 11:58 AM EDT  Subject: MRI    I called [facility] to schedule the MRI and they said they needed the faxed order from my doctor first.    Can you advise what i need to do?     Thanks,

## 2023-11-02 RX ORDER — METHYLPREDNISOLONE 4 MG/1
TABLET ORAL
Qty: 1 KIT | Refills: 0 | Status: SHIPPED | OUTPATIENT
Start: 2023-11-02 | End: 2023-11-08

## 2023-11-02 NOTE — TELEPHONE ENCOUNTER
Requested Prescriptions     Pending Prescriptions Disp Refills    methylPREDNISolone (MEDROL DOSEPACK) 4 MG tablet 1 kit 0     Sig: Take by mouth.      [facility]

## 2023-11-07 ENCOUNTER — PATIENT MESSAGE (OUTPATIENT)
Dept: INTERNAL MEDICINE CLINIC | Facility: CLINIC | Age: 63
End: 2023-11-07

## 2023-11-08 DIAGNOSIS — M54.2 NECK PAIN: Primary | ICD-10-CM

## 2023-11-08 DIAGNOSIS — M54.12 CERVICAL RADICULOPATHY: Primary | ICD-10-CM

## 2023-11-08 RX ORDER — PREGABALIN 50 MG/1
50 CAPSULE ORAL 3 TIMES DAILY
Qty: 90 CAPSULE | Refills: 0 | Status: SHIPPED | OUTPATIENT
Start: 2023-11-08 | End: 2023-12-08

## 2023-11-09 ENCOUNTER — OFFICE VISIT (OUTPATIENT)
Dept: ORTHOPEDIC SURGERY | Age: 63
End: 2023-11-09

## 2023-11-09 DIAGNOSIS — F40.240 CLAUSTROPHOBIA: ICD-10-CM

## 2023-11-09 DIAGNOSIS — M54.12 CERVICAL RADICULOPATHY: ICD-10-CM

## 2023-11-09 DIAGNOSIS — M54.2 NECK PAIN: Primary | ICD-10-CM

## 2023-11-09 RX ORDER — ALPRAZOLAM 0.25 MG/1
TABLET ORAL
Qty: 2 TABLET | Refills: 0 | Status: SHIPPED | OUTPATIENT
Start: 2023-11-09 | End: 2023-12-28

## 2023-11-09 RX ORDER — DICLOFENAC POTASSIUM 50 MG/1
50 TABLET, FILM COATED ORAL 3 TIMES DAILY
Qty: 30 TABLET | Refills: 0 | Status: SHIPPED | OUTPATIENT
Start: 2023-11-09

## 2023-11-14 ENCOUNTER — PATIENT MESSAGE (OUTPATIENT)
Dept: ORTHOPEDIC SURGERY | Age: 63
End: 2023-11-14

## 2023-11-14 DIAGNOSIS — M54.12 CERVICAL RADICULOPATHY: Primary | ICD-10-CM

## 2023-11-14 DIAGNOSIS — M54.2 NECK PAIN: ICD-10-CM

## 2023-11-16 DIAGNOSIS — M54.2 NECK PAIN: ICD-10-CM

## 2023-11-16 DIAGNOSIS — M54.12 CERVICAL RADICULOPATHY: Primary | ICD-10-CM

## 2023-11-16 RX ORDER — TRAMADOL HYDROCHLORIDE 50 MG/1
50 TABLET ORAL EVERY 6 HOURS PRN
Qty: 28 TABLET | Refills: 0 | Status: CANCELLED | OUTPATIENT
Start: 2023-11-16 | End: 2023-11-23

## 2023-11-16 NOTE — TELEPHONE ENCOUNTER
From: Monika Narvaez  To: Farhana Mccauley  Sent: 11/14/2023 11:35 AM EST  Subject: Pain     Dr. Shaina Perez, the pain in my shoulder, arm, hand and shoulder blade area has not gotten any better. The Pregabalin and Diclofenac are doing nothing at to help with the pain. I cannot sleep well nor work due to this pain and interruption. I am going to stop taking these two medications. What other medication for this type pain is available?      Thank you    Olivia Cm

## 2023-11-17 RX ORDER — TRAMADOL HYDROCHLORIDE 50 MG/1
50 TABLET ORAL EVERY 8 HOURS PRN
Qty: 15 TABLET | Refills: 0 | Status: SHIPPED | OUTPATIENT
Start: 2023-11-17 | End: 2023-11-22

## 2023-11-17 RX ORDER — TRAMADOL HYDROCHLORIDE 50 MG/1
50 TABLET ORAL EVERY 6 HOURS PRN
Qty: 28 TABLET | Refills: 0 | OUTPATIENT
Start: 2023-11-17 | End: 2023-11-24

## 2023-11-30 ENCOUNTER — TELEPHONE (OUTPATIENT)
Dept: ORTHOPEDIC SURGERY | Age: 63
End: 2023-11-30

## 2023-12-04 ENCOUNTER — OFFICE VISIT (OUTPATIENT)
Dept: ORTHOPEDIC SURGERY | Age: 63
End: 2023-12-04
Payer: COMMERCIAL

## 2023-12-04 DIAGNOSIS — M48.02 FORAMINAL STENOSIS OF CERVICAL REGION: Primary | ICD-10-CM

## 2023-12-04 DIAGNOSIS — M54.12 CERVICAL RADICULOPATHY: ICD-10-CM

## 2023-12-04 PROCEDURE — 99214 OFFICE O/P EST MOD 30 MIN: CPT | Performed by: PHYSICIAN ASSISTANT
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
## 2023-12-11 DIAGNOSIS — F41.0 PANIC DISORDER: ICD-10-CM

## 2023-12-11 RX ORDER — PAROXETINE 10 MG/1
10 TABLET, FILM COATED ORAL DAILY
Qty: 90 TABLET | Refills: 1 | OUTPATIENT
Start: 2023-12-11

## 2023-12-12 DIAGNOSIS — F41.0 PANIC DISORDER: ICD-10-CM

## 2023-12-12 RX ORDER — PAROXETINE 10 MG/1
10 TABLET, FILM COATED ORAL DAILY
Qty: 90 TABLET | Refills: 1 | Status: SHIPPED | OUTPATIENT
Start: 2023-12-12 | End: 2023-12-28 | Stop reason: SDUPTHER

## 2023-12-12 NOTE — TELEPHONE ENCOUNTER
Requested Prescriptions     Pending Prescriptions Disp Refills    PARoxetine (PAXIL) 10 MG tablet 90 tablet 1     Sig: Take 1 tablet by mouth daily     Dose verified and to [facility]. [employer] message sent to patient to schedule an follow up appointment.

## 2023-12-28 ENCOUNTER — PATIENT MESSAGE (OUTPATIENT)
Dept: INTERNAL MEDICINE CLINIC | Facility: CLINIC | Age: 63
End: 2023-12-28

## 2023-12-28 DIAGNOSIS — F41.0 PANIC DISORDER: ICD-10-CM

## 2023-12-28 RX ORDER — PAROXETINE 10 MG/1
10 TABLET, FILM COATED ORAL DAILY
Qty: 90 TABLET | Refills: 2 | Status: SHIPPED | OUTPATIENT
Start: 2023-12-28

## 2023-12-30 DIAGNOSIS — I10 PRIMARY HYPERTENSION: ICD-10-CM

## 2023-12-31 RX ORDER — LISINOPRIL 10 MG/1
10 TABLET ORAL DAILY
Qty: 90 TABLET | Refills: 0 | OUTPATIENT
Start: 2023-12-31

## 2024-01-08 DIAGNOSIS — I10 HYPERTENSION, ESSENTIAL: Primary | ICD-10-CM

## 2024-01-08 RX ORDER — LISINOPRIL 10 MG/1
10 TABLET ORAL DAILY
Qty: 90 TABLET | Refills: 1 | Status: SHIPPED | OUTPATIENT
Start: 2024-01-08

## 2024-04-01 DIAGNOSIS — I10 HYPERTENSION, ESSENTIAL: ICD-10-CM

## 2024-04-01 DIAGNOSIS — F51.01 PRIMARY INSOMNIA: ICD-10-CM

## 2024-04-01 RX ORDER — LISINOPRIL 10 MG/1
10 TABLET ORAL DAILY
Qty: 90 TABLET | Refills: 1 | Status: SHIPPED | OUTPATIENT
Start: 2024-04-01

## 2024-04-01 RX ORDER — TRAZODONE HYDROCHLORIDE 50 MG/1
50 TABLET ORAL NIGHTLY
Qty: 90 TABLET | Refills: 1 | Status: SHIPPED | OUTPATIENT
Start: 2024-04-01

## 2024-04-01 NOTE — TELEPHONE ENCOUNTER
Requested Prescriptions     Pending Prescriptions Disp Refills    traZODone (DESYREL) 50 MG tablet 90 tablet 1     Sig: Take 1 tablet by mouth nightly    lisinopril (PRINIVIL;ZESTRIL) 10 MG tablet 90 tablet 1     Sig: Take 1 tablet by mouth daily    Next ov 07/17/2024. Pharmacy confirmed. Lisinopril was sent to incorrect pharmacy. [facility] [address] .

## 2024-07-16 ENCOUNTER — PATIENT MESSAGE (OUTPATIENT)
Dept: INTERNAL MEDICINE CLINIC | Facility: CLINIC | Age: 64
End: 2024-07-16

## 2024-07-16 DIAGNOSIS — F41.1 GENERALIZED ANXIETY DISORDER WITH PANIC ATTACKS: Primary | ICD-10-CM

## 2024-07-16 DIAGNOSIS — F41.0 GENERALIZED ANXIETY DISORDER WITH PANIC ATTACKS: Primary | ICD-10-CM

## 2024-07-16 NOTE — TELEPHONE ENCOUNTER
From: Jeff Brown  To: Dr. Shivam Fu  Sent: 7/16/2024 4:33 AM EDT  Subject: ClonasPAM 0.5     Hi Doc Nickie, can you please refill my ClonasePAM 0.5 prescription, although i very rarely take this med, I am out and would like to have them available.       Xiang Brown [phone]

## 2024-07-17 NOTE — TELEPHONE ENCOUNTER
Requested Prescriptions     Pending Prescriptions Disp Refills    clonazePAM (KLONOPIN) 0.5 MG tablet 30 tablet 0     Sig: Take 0.5 tablets by mouth daily as needed for Anxiety. Max Daily Amount: 0.25 mg     Dose verified and to [facility].   Patient is scheduled for follow up visit.

## 2024-07-18 RX ORDER — CLONAZEPAM 0.5 MG/1
0.25 TABLET ORAL DAILY PRN
Qty: 30 TABLET | Refills: 1 | Status: SHIPPED | OUTPATIENT
Start: 2024-07-18 | End: 2025-07-18

## 2024-09-23 DIAGNOSIS — I10 HYPERTENSION, ESSENTIAL: ICD-10-CM

## 2024-09-23 DIAGNOSIS — F51.01 PRIMARY INSOMNIA: ICD-10-CM

## 2024-09-23 RX ORDER — LISINOPRIL 10 MG/1
10 TABLET ORAL DAILY
Qty: 90 TABLET | Refills: 1 | Status: SHIPPED | OUTPATIENT
Start: 2024-09-23

## 2024-09-23 RX ORDER — TRAZODONE HYDROCHLORIDE 50 MG/1
50 TABLET, FILM COATED ORAL NIGHTLY
Qty: 90 TABLET | Refills: 1 | Status: SHIPPED | OUTPATIENT
Start: 2024-09-23

## 2024-12-11 DIAGNOSIS — F41.0 PANIC DISORDER: ICD-10-CM

## 2024-12-11 DIAGNOSIS — I10 HYPERTENSION, ESSENTIAL: ICD-10-CM

## 2024-12-11 RX ORDER — LISINOPRIL 10 MG/1
10 TABLET ORAL DAILY
Qty: 90 TABLET | Refills: 1 | OUTPATIENT
Start: 2024-12-11

## 2024-12-11 RX ORDER — PAROXETINE 10 MG/1
10 TABLET, FILM COATED ORAL DAILY
Qty: 90 TABLET | Refills: 2 | OUTPATIENT
Start: 2024-12-11

## 2024-12-11 NOTE — TELEPHONE ENCOUNTER
Requested Prescriptions     Pending Prescriptions Disp Refills    PARoxetine (PAXIL) 10 MG tablet [Pharmacy Med Name: PAROXETINE HCL 10 MG TABLET] 90 tablet 2     Sig: TAKE 1 TABLET BY MOUTH EVERY DAY     [facility]. Patient is scheduled for follow up visit.

## 2024-12-16 ENCOUNTER — TELEPHONE (OUTPATIENT)
Dept: INTERNAL MEDICINE CLINIC | Facility: CLINIC | Age: 64
End: 2024-12-16

## 2024-12-16 DIAGNOSIS — F41.0 PANIC DISORDER: ICD-10-CM

## 2024-12-16 DIAGNOSIS — I10 HYPERTENSION, ESSENTIAL: ICD-10-CM

## 2024-12-16 RX ORDER — PAROXETINE 10 MG/1
10 TABLET, FILM COATED ORAL DAILY
Qty: 14 TABLET | Refills: 0 | Status: SHIPPED | OUTPATIENT
Start: 2024-12-16

## 2024-12-16 RX ORDER — LISINOPRIL 10 MG/1
10 TABLET ORAL DAILY
Qty: 14 TABLET | Refills: 0 | Status: SHIPPED | OUTPATIENT
Start: 2024-12-16

## 2024-12-16 NOTE — TELEPHONE ENCOUNTER
Spoke with patient regarding appointment.  I was able to schedule him 12/27/24 and advised him he needs to come in for labs prior to the appointment, He is good with that but asking if we can send enough medication just to get him through until the appointment?

## 2024-12-16 NOTE — TELEPHONE ENCOUNTER
Requested Prescriptions     Pending Prescriptions Disp Refills    lisinopril (PRINIVIL;ZESTRIL) 10 MG tablet 14 tablet 0     Sig: Take 1 tablet by mouth daily    PARoxetine (PAXIL) 10 MG tablet 14 tablet 0     Sig: Take 1 tablet by mouth daily

## 2024-12-23 DIAGNOSIS — E78.2 HYPERLIPIDEMIA, MIXED: ICD-10-CM

## 2024-12-23 DIAGNOSIS — R73.01 IMPAIRED FASTING GLUCOSE: ICD-10-CM

## 2024-12-23 DIAGNOSIS — Z12.5 PROSTATE CANCER SCREENING: Chronic | ICD-10-CM

## 2024-12-23 DIAGNOSIS — I10 HYPERTENSION, ESSENTIAL: ICD-10-CM

## 2024-12-23 DIAGNOSIS — I10 HYPERTENSION, ESSENTIAL: Primary | ICD-10-CM

## 2024-12-23 DIAGNOSIS — F41.0 PANIC DISORDER: ICD-10-CM

## 2024-12-23 LAB
ALBUMIN SERPL-MCNC: 4 G/DL (ref 3.2–4.6)
ALBUMIN/GLOB SERPL: 1.5 (ref 1–1.9)
ALP SERPL-CCNC: 76 U/L (ref 40–129)
ALT SERPL-CCNC: 38 U/L (ref 8–55)
ANION GAP SERPL CALC-SCNC: 10 MMOL/L (ref 7–16)
AST SERPL-CCNC: 26 U/L (ref 15–37)
BASOPHILS # BLD: 0.1 K/UL (ref 0–0.2)
BASOPHILS NFR BLD: 1 % (ref 0–2)
BILIRUB SERPL-MCNC: 0.2 MG/DL (ref 0–1.2)
BUN SERPL-MCNC: 21 MG/DL (ref 8–23)
CALCIUM SERPL-MCNC: 9.3 MG/DL (ref 8.8–10.2)
CHLORIDE SERPL-SCNC: 103 MMOL/L (ref 98–107)
CHOLEST SERPL-MCNC: 200 MG/DL (ref 0–200)
CO2 SERPL-SCNC: 26 MMOL/L (ref 20–29)
CREAT SERPL-MCNC: 1.18 MG/DL (ref 0.8–1.3)
DIFFERENTIAL METHOD BLD: NORMAL
EOSINOPHIL # BLD: 0.4 K/UL (ref 0–0.8)
EOSINOPHIL NFR BLD: 5 % (ref 0.5–7.8)
ERYTHROCYTE [DISTWIDTH] IN BLOOD BY AUTOMATED COUNT: 12.5 % (ref 11.9–14.6)
EST. AVERAGE GLUCOSE BLD GHB EST-MCNC: 137 MG/DL
GLOBULIN SER CALC-MCNC: 2.7 G/DL (ref 2.3–3.5)
GLUCOSE SERPL-MCNC: 92 MG/DL (ref 70–99)
HBA1C MFR BLD: 6.4 % (ref 0–5.6)
HCT VFR BLD AUTO: 47.4 % (ref 41.1–50.3)
HDLC SERPL-MCNC: 30 MG/DL (ref 40–60)
HDLC SERPL: 6.6 (ref 0–5)
HGB BLD-MCNC: 15.7 G/DL (ref 13.6–17.2)
IMM GRANULOCYTES # BLD AUTO: 0 K/UL (ref 0–0.5)
IMM GRANULOCYTES NFR BLD AUTO: 0 % (ref 0–5)
LDLC SERPL CALC-MCNC: ABNORMAL MG/DL (ref 0–100)
LDLC SERPL DIRECT ASSAY-MCNC: 122 MG/DL (ref 0–100)
LYMPHOCYTES # BLD: 2.1 K/UL (ref 0.5–4.6)
LYMPHOCYTES NFR BLD: 27 % (ref 13–44)
MCH RBC QN AUTO: 30.4 PG (ref 26.1–32.9)
MCHC RBC AUTO-ENTMCNC: 33.1 G/DL (ref 31.4–35)
MCV RBC AUTO: 91.9 FL (ref 82–102)
MONOCYTES # BLD: 1 K/UL (ref 0.1–1.3)
MONOCYTES NFR BLD: 12 % (ref 4–12)
NEUTS SEG # BLD: 4.2 K/UL (ref 1.7–8.2)
NEUTS SEG NFR BLD: 55 % (ref 43–78)
NRBC # BLD: 0 K/UL (ref 0–0.2)
PLATELET # BLD AUTO: 232 K/UL (ref 150–450)
PMV BLD AUTO: 11.4 FL (ref 9.4–12.3)
POTASSIUM SERPL-SCNC: 4.3 MMOL/L (ref 3.5–5.1)
PROT SERPL-MCNC: 6.7 G/DL (ref 6.3–8.2)
PSA SERPL-MCNC: 0.5 NG/ML (ref 0–4)
RBC # BLD AUTO: 5.16 M/UL (ref 4.23–5.6)
SODIUM SERPL-SCNC: 138 MMOL/L (ref 136–145)
TRIGL SERPL-MCNC: 414 MG/DL (ref 0–150)
VLDLC SERPL CALC-MCNC: 83 MG/DL (ref 6–23)
WBC # BLD AUTO: 7.8 K/UL (ref 4.3–11.1)

## 2024-12-23 RX ORDER — PAROXETINE 10 MG/1
10 TABLET, FILM COATED ORAL DAILY
Qty: 90 TABLET | Refills: 1 | Status: SHIPPED | OUTPATIENT
Start: 2024-12-23 | End: 2024-12-27 | Stop reason: SDUPTHER

## 2024-12-23 RX ORDER — LISINOPRIL 10 MG/1
10 TABLET ORAL DAILY
Qty: 90 TABLET | Refills: 1 | Status: SHIPPED | OUTPATIENT
Start: 2024-12-23 | End: 2024-12-27 | Stop reason: SDUPTHER

## 2024-12-27 ENCOUNTER — OFFICE VISIT (OUTPATIENT)
Dept: INTERNAL MEDICINE CLINIC | Facility: CLINIC | Age: 64
End: 2024-12-27
Payer: COMMERCIAL

## 2024-12-27 VITALS
HEIGHT: 69 IN | WEIGHT: 216 LBS | HEART RATE: 93 BPM | BODY MASS INDEX: 31.99 KG/M2 | SYSTOLIC BLOOD PRESSURE: 120 MMHG | DIASTOLIC BLOOD PRESSURE: 80 MMHG

## 2024-12-27 DIAGNOSIS — F41.0 GENERALIZED ANXIETY DISORDER WITH PANIC ATTACKS: ICD-10-CM

## 2024-12-27 DIAGNOSIS — R73.01 IMPAIRED FASTING GLUCOSE: Primary | ICD-10-CM

## 2024-12-27 DIAGNOSIS — F41.0 PANIC DISORDER: ICD-10-CM

## 2024-12-27 DIAGNOSIS — Z12.5 PROSTATE CANCER SCREENING: Chronic | ICD-10-CM

## 2024-12-27 DIAGNOSIS — I10 HYPERTENSION, ESSENTIAL: ICD-10-CM

## 2024-12-27 DIAGNOSIS — N52.9 ERECTILE DYSFUNCTION, UNSPECIFIED ERECTILE DYSFUNCTION TYPE: ICD-10-CM

## 2024-12-27 DIAGNOSIS — E78.2 HYPERLIPIDEMIA, MIXED: ICD-10-CM

## 2024-12-27 DIAGNOSIS — Z12.11 COLON CANCER SCREENING: Chronic | ICD-10-CM

## 2024-12-27 DIAGNOSIS — F41.1 GENERALIZED ANXIETY DISORDER WITH PANIC ATTACKS: ICD-10-CM

## 2024-12-27 PROCEDURE — 99214 OFFICE O/P EST MOD 30 MIN: CPT | Performed by: INTERNAL MEDICINE

## 2024-12-27 PROCEDURE — 3074F SYST BP LT 130 MM HG: CPT | Performed by: INTERNAL MEDICINE

## 2024-12-27 PROCEDURE — 3079F DIAST BP 80-89 MM HG: CPT | Performed by: INTERNAL MEDICINE

## 2024-12-27 RX ORDER — LISINOPRIL 10 MG/1
10 TABLET ORAL DAILY
Qty: 90 TABLET | Refills: 1 | Status: SHIPPED | OUTPATIENT
Start: 2024-12-27

## 2024-12-27 RX ORDER — CLONAZEPAM 0.5 MG/1
0.25 TABLET ORAL DAILY PRN
Qty: 30 TABLET | Refills: 1 | Status: SHIPPED | OUTPATIENT
Start: 2024-12-27 | End: 2025-12-27

## 2024-12-27 RX ORDER — PAROXETINE 10 MG/1
10 TABLET, FILM COATED ORAL DAILY
Qty: 90 TABLET | Refills: 1 | Status: SHIPPED | OUTPATIENT
Start: 2024-12-27

## 2024-12-27 SDOH — ECONOMIC STABILITY: INCOME INSECURITY: HOW HARD IS IT FOR YOU TO PAY FOR THE VERY BASICS LIKE FOOD, HOUSING, MEDICAL CARE, AND HEATING?: NOT HARD AT ALL

## 2024-12-27 SDOH — ECONOMIC STABILITY: FOOD INSECURITY: WITHIN THE PAST 12 MONTHS, THE FOOD YOU BOUGHT JUST DIDN'T LAST AND YOU DIDN'T HAVE MONEY TO GET MORE.: NEVER TRUE

## 2024-12-27 SDOH — ECONOMIC STABILITY: FOOD INSECURITY: WITHIN THE PAST 12 MONTHS, YOU WORRIED THAT YOUR FOOD WOULD RUN OUT BEFORE YOU GOT MONEY TO BUY MORE.: NEVER TRUE

## 2024-12-27 ASSESSMENT — PATIENT HEALTH QUESTIONNAIRE - PHQ9
SUM OF ALL RESPONSES TO PHQ QUESTIONS 1-9: 0
SUM OF ALL RESPONSES TO PHQ9 QUESTIONS 1 & 2: 0
2. FEELING DOWN, DEPRESSED OR HOPELESS: NOT AT ALL
SUM OF ALL RESPONSES TO PHQ QUESTIONS 1-9: 0
SUM OF ALL RESPONSES TO PHQ QUESTIONS 1-9: 0
1. LITTLE INTEREST OR PLEASURE IN DOING THINGS: NOT AT ALL
SUM OF ALL RESPONSES TO PHQ QUESTIONS 1-9: 0

## 2024-12-27 ASSESSMENT — ENCOUNTER SYMPTOMS
CHOKING: 0
VOICE CHANGE: 0
EYE PAIN: 0
STRIDOR: 0
RECTAL PAIN: 0

## 2024-12-27 NOTE — PROGRESS NOTES
FOLLOWUP VISIT    Subjective:    Mr. Brown is a 64 y.o., male,   Chief Complaint   Patient presents with    Follow-up    Medication Refill       HPI:    Patient presents today for follow up of two or more chronic medical problems and review of labs.       The patient has impaired fasting glucose tolerance.  The patient remains on attempted diet exercise and weight loss therapy.  The patient denies any symptoms of hyperglycemia.      The patient has hypertension.  The patient has been on an attempted low sodium diet and has been trying to exercise and maintain a healthy weight.  The patient reports good compliance with the blood pressure medications.       His MEIR and panic are controlled with Paxil.  Rarely uses clonazepam.      The patient has hyperlipidemia.  The patient has been attempting to follow a low cholesterol diet.    The following portions of the patient's history were reviewed and updated as appropriate:      Past Medical History:   Diagnosis Date    Dyslipidemia     ED (erectile dysfunction)     HSV-2 infection     Hypertension     Impaired fasting glucose     Panic disorder        Past Surgical History:   Procedure Laterality Date    TONSILLECTOMY         Family History   Problem Relation Age of Onset    Breast Cancer Mother     Diabetes Father     Hypertension Father     Cancer Maternal Grandfather         stomach       Social History     Socioeconomic History    Marital status:      Spouse name: Not on file    Number of children: Not on file    Years of education: Not on file    Highest education level: Not on file   Occupational History    Not on file   Tobacco Use    Smoking status: Never    Smokeless tobacco: Never   Substance and Sexual Activity    Alcohol use: No    Drug use: No    Sexual activity: Not on file   Other Topics Concern    Not on file   Social History Narrative    Not on file     Social Determinants of Health     Financial Resource Strain: Low Risk  (12/27/2024)

## 2025-01-27 ENCOUNTER — OFFICE VISIT (OUTPATIENT)
Dept: INTERNAL MEDICINE CLINIC | Facility: CLINIC | Age: 65
End: 2025-01-27
Payer: COMMERCIAL

## 2025-01-27 VITALS
TEMPERATURE: 97.7 F | OXYGEN SATURATION: 97 % | WEIGHT: 222.4 LBS | HEIGHT: 69 IN | SYSTOLIC BLOOD PRESSURE: 128 MMHG | HEART RATE: 88 BPM | DIASTOLIC BLOOD PRESSURE: 72 MMHG | BODY MASS INDEX: 32.94 KG/M2

## 2025-01-27 DIAGNOSIS — R52 BODY ACHES: ICD-10-CM

## 2025-01-27 DIAGNOSIS — J10.1 INFLUENZA A: Primary | ICD-10-CM

## 2025-01-27 DIAGNOSIS — R50.9 FEVER, UNSPECIFIED FEVER CAUSE: ICD-10-CM

## 2025-01-27 LAB
INFLUENZA A ANTIGEN, POC: POSITIVE
INFLUENZA B ANTIGEN, POC: NEGATIVE
LOT EXPIRE DATE: ABNORMAL
LOT KIT NUMBER: ABNORMAL
SARS-COV-2 RNA, POC: NEGATIVE
VALID INTERNAL CONTROL: YES
VENDOR AND KIT NAME POC: ABNORMAL

## 2025-01-27 PROCEDURE — 87428 SARSCOV & INF VIR A&B AG IA: CPT | Performed by: INTERNAL MEDICINE

## 2025-01-27 PROCEDURE — 3078F DIAST BP <80 MM HG: CPT | Performed by: INTERNAL MEDICINE

## 2025-01-27 PROCEDURE — 99213 OFFICE O/P EST LOW 20 MIN: CPT | Performed by: INTERNAL MEDICINE

## 2025-01-27 PROCEDURE — 3074F SYST BP LT 130 MM HG: CPT | Performed by: INTERNAL MEDICINE

## 2025-01-27 RX ORDER — OSELTAMIVIR PHOSPHATE 75 MG/1
75 CAPSULE ORAL 2 TIMES DAILY
Qty: 10 CAPSULE | Refills: 0 | Status: SHIPPED | OUTPATIENT
Start: 2025-01-27 | End: 2025-02-01

## 2025-01-27 ASSESSMENT — ENCOUNTER SYMPTOMS
STRIDOR: 0
EYE PAIN: 0
VOICE CHANGE: 0
RECTAL PAIN: 0
CHOKING: 0

## 2025-01-27 NOTE — PROGRESS NOTES
Immature Granulocytes % 12/23/2024 0  0.0 - 5.0 % Final    Neutrophils Absolute 12/23/2024 4.2  1.7 - 8.2 K/UL Final    Lymphocytes Absolute 12/23/2024 2.1  0.5 - 4.6 K/UL Final    Monocytes Absolute 12/23/2024 1.0  0.1 - 1.3 K/UL Final    Eosinophils Absolute 12/23/2024 0.4  0.0 - 0.8 K/UL Final    Basophils Absolute 12/23/2024 0.1  0.0 - 0.2 K/UL Final    Immature Granulocytes Absolute 12/23/2024 0.0  0.0 - 0.5 K/UL Final    LDL Direct 12/23/2024 122 (H)  0 - 100 mg/dl Final    Comment: Near Optimal: 100-129 mg/dL  Borderline High: 130-159, High: 160-189 mg/dL  Very High: Greater than or equal to 190 mg/dL           Assessent & Plan:        1. Influenza A  -     oseltamivir (TAMIFLU) 75 MG capsule; Take 1 capsule by mouth 2 times daily for 5 days, Disp-10 capsule, R-0Normal  2. Fever, unspecified fever cause  -     AMB POC SARS-COV-2 AND INFLUENZA A/B  3. Body aches    Will treat with Tamiflu.  Recommended OTC Coricidin HBP products for symptom relief.      The patient and/or patient representative voiced understanding and agreement with the current diagnoses, recommendations, and possible side effects.    Return if symptoms worsen or fail to improve.

## 2025-03-19 DIAGNOSIS — I10 HYPERTENSION, ESSENTIAL: ICD-10-CM

## 2025-03-19 DIAGNOSIS — F41.0 PANIC DISORDER: ICD-10-CM

## 2025-03-19 RX ORDER — PAROXETINE 10 MG/1
10 TABLET, FILM COATED ORAL DAILY
Qty: 90 TABLET | Refills: 1 | Status: SHIPPED | OUTPATIENT
Start: 2025-03-19

## 2025-03-19 RX ORDER — LISINOPRIL 10 MG/1
10 TABLET ORAL DAILY
Qty: 90 TABLET | Refills: 1 | Status: SHIPPED | OUTPATIENT
Start: 2025-03-19

## 2025-03-19 NOTE — TELEPHONE ENCOUNTER
Requested Prescriptions     Pending Prescriptions Disp Refills    lisinopril (PRINIVIL;ZESTRIL) 10 MG tablet 90 tablet 1     Sig: Take 1 tablet by mouth daily    PARoxetine (PAXIL) 10 MG tablet 90 tablet 1     Sig: Take 1 tablet by mouth daily    Next ov 06/27/2025. Pharmacy confirmed  .

## 2025-06-17 ENCOUNTER — PATIENT MESSAGE (OUTPATIENT)
Dept: INTERNAL MEDICINE CLINIC | Facility: CLINIC | Age: 65
End: 2025-06-17

## 2025-06-17 DIAGNOSIS — I10 HYPERTENSION, ESSENTIAL: ICD-10-CM

## 2025-06-17 DIAGNOSIS — F41.0 PANIC DISORDER: ICD-10-CM

## 2025-06-17 RX ORDER — LISINOPRIL 10 MG/1
10 TABLET ORAL DAILY
Qty: 90 TABLET | Refills: 1 | Status: SHIPPED | OUTPATIENT
Start: 2025-06-17

## 2025-06-17 RX ORDER — PAROXETINE 10 MG/1
10 TABLET, FILM COATED ORAL DAILY
Qty: 90 TABLET | Refills: 1 | Status: SHIPPED | OUTPATIENT
Start: 2025-06-17

## 2025-06-17 NOTE — TELEPHONE ENCOUNTER
Requested Prescriptions     Pending Prescriptions Disp Refills    lisinopril (PRINIVIL;ZESTRIL) 10 MG tablet 90 tablet 1     Sig: Take 1 tablet by mouth daily    PARoxetine (PAXIL) 10 MG tablet 90 tablet 1     Sig: Take 1 tablet by mouth daily     [facility]. Patient is scheduled for follow up visit.

## 2025-06-18 DIAGNOSIS — I10 HYPERTENSION, ESSENTIAL: ICD-10-CM

## 2025-06-18 DIAGNOSIS — F41.0 PANIC DISORDER: ICD-10-CM

## 2025-06-18 RX ORDER — PAROXETINE 10 MG/1
10 TABLET, FILM COATED ORAL DAILY
Qty: 90 TABLET | Refills: 1 | Status: CANCELLED | OUTPATIENT
Start: 2025-06-18

## 2025-06-18 RX ORDER — LISINOPRIL 10 MG/1
10 TABLET ORAL DAILY
Qty: 90 TABLET | Refills: 1 | Status: CANCELLED | OUTPATIENT
Start: 2025-06-18

## 2025-07-08 ENCOUNTER — OFFICE VISIT (OUTPATIENT)
Dept: INTERNAL MEDICINE CLINIC | Facility: CLINIC | Age: 65
End: 2025-07-08
Payer: COMMERCIAL

## 2025-07-08 VITALS
DIASTOLIC BLOOD PRESSURE: 76 MMHG | BODY MASS INDEX: 31.1 KG/M2 | HEART RATE: 74 BPM | WEIGHT: 210 LBS | HEIGHT: 69 IN | OXYGEN SATURATION: 98 % | SYSTOLIC BLOOD PRESSURE: 110 MMHG

## 2025-07-08 DIAGNOSIS — R10.32 LLQ PAIN: Primary | ICD-10-CM

## 2025-07-08 PROCEDURE — 3074F SYST BP LT 130 MM HG: CPT | Performed by: INTERNAL MEDICINE

## 2025-07-08 PROCEDURE — 3078F DIAST BP <80 MM HG: CPT | Performed by: INTERNAL MEDICINE

## 2025-07-08 PROCEDURE — 99213 OFFICE O/P EST LOW 20 MIN: CPT | Performed by: INTERNAL MEDICINE

## 2025-07-08 SDOH — ECONOMIC STABILITY: FOOD INSECURITY: WITHIN THE PAST 12 MONTHS, YOU WORRIED THAT YOUR FOOD WOULD RUN OUT BEFORE YOU GOT MONEY TO BUY MORE.: NEVER TRUE

## 2025-07-08 SDOH — ECONOMIC STABILITY: FOOD INSECURITY: WITHIN THE PAST 12 MONTHS, THE FOOD YOU BOUGHT JUST DIDN'T LAST AND YOU DIDN'T HAVE MONEY TO GET MORE.: NEVER TRUE

## 2025-07-08 ASSESSMENT — PATIENT HEALTH QUESTIONNAIRE - PHQ9
2. FEELING DOWN, DEPRESSED OR HOPELESS: NOT AT ALL
1. LITTLE INTEREST OR PLEASURE IN DOING THINGS: NOT AT ALL
SUM OF ALL RESPONSES TO PHQ QUESTIONS 1-9: 0

## 2025-07-08 ASSESSMENT — ENCOUNTER SYMPTOMS
VOICE CHANGE: 0
EYE PAIN: 0
RECTAL PAIN: 0
CHOKING: 0
STRIDOR: 0

## 2025-07-08 NOTE — PROGRESS NOTES
FOLLOWUP VISIT    Subjective:    Mr. Brown is a 64 y.o., male,   Chief Complaint   Patient presents with    Abdominal Pain       HPI:    For the last 4-5 days the patient has had intermittent left lower quadrant abdominal pain.  No fever or chills.  No nausea or vomiting.  No diarrhea or constipation.  No dysuria or hematuria.  Does not feel exactly like previous kidney stone / ureteral colic.  Last colonoscopy 2017 (I cannot pull up report).     The following portions of the patient's history were reviewed and updated as appropriate:      Past Medical History:   Diagnosis Date    Dyslipidemia     ED (erectile dysfunction)     HSV-2 infection     Hypertension     Impaired fasting glucose     Panic disorder        Past Surgical History:   Procedure Laterality Date    TONSILLECTOMY         Family History   Problem Relation Age of Onset    Breast Cancer Mother     Diabetes Father     Hypertension Father     Cancer Maternal Grandfather         stomach       Social History     Socioeconomic History    Marital status:      Spouse name: Not on file    Number of children: Not on file    Years of education: Not on file    Highest education level: Not on file   Occupational History    Not on file   Tobacco Use    Smoking status: Never    Smokeless tobacco: Never   Substance and Sexual Activity    Alcohol use: No    Drug use: No    Sexual activity: Not on file   Other Topics Concern    Not on file   Social History Narrative    Not on file     Social Drivers of Health     Financial Resource Strain: Low Risk  (12/27/2024)    Overall Financial Resource Strain (CARDIA)     Difficulty of Paying Living Expenses: Not hard at all   Food Insecurity: No Food Insecurity (7/8/2025)    Hunger Vital Sign     Worried About Running Out of Food in the Last Year: Never true     Ran Out of Food in the Last Year: Never true   Transportation Needs: No Transportation Needs (7/8/2025)    PRAPARE - Transportation     Lack of Transportation

## 2025-08-05 DIAGNOSIS — F51.04 CHRONIC INSOMNIA: Primary | ICD-10-CM

## 2025-08-05 RX ORDER — DOXEPIN HYDROCHLORIDE 25 MG/1
25 CAPSULE ORAL NIGHTLY
Qty: 30 CAPSULE | Refills: 0 | Status: SHIPPED | OUTPATIENT
Start: 2025-08-05